# Patient Record
Sex: FEMALE | Race: OTHER | HISPANIC OR LATINO
[De-identification: names, ages, dates, MRNs, and addresses within clinical notes are randomized per-mention and may not be internally consistent; named-entity substitution may affect disease eponyms.]

---

## 2022-06-17 PROBLEM — Z00.00 ENCOUNTER FOR PREVENTIVE HEALTH EXAMINATION: Status: ACTIVE | Noted: 2022-06-17

## 2022-06-22 ENCOUNTER — APPOINTMENT (OUTPATIENT)
Dept: PEDIATRIC SURGERY | Facility: CLINIC | Age: 27
End: 2022-06-22
Payer: COMMERCIAL

## 2022-06-22 DIAGNOSIS — O28.3 ABNORMAL ULTRASONIC FINDING ON ANTENATAL SCREENING OF MOTHER: ICD-10-CM

## 2022-06-22 PROCEDURE — 99204 OFFICE O/P NEW MOD 45 MIN: CPT | Mod: 95

## 2022-06-26 PROBLEM — O28.3 ABNORMAL FETAL ULTRASOUND: Status: ACTIVE | Noted: 2022-06-26

## 2022-06-26 NOTE — ASSESSMENT
[FreeTextEntry1] : Ms Bella is a 27 year old female, now carrying a 34 week gestational age fetus found to have a suprarenal lesion.  This was recently seen on prenatal US as a 2.5 x 1.7 x 2.6 cm cystic structure superio-medial to the left kidney without any flow.  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  \par \par I spent time educating Ms. Bella about this finding and offered reassurance. I reviewed the differential diagnosis and the possibility that this represents an infra-diaphragmatic lesion such as a sequestration vs. a lesion of adrenal (or extra-adrenal) origin such as neuroblastoma vs an adrenal hemorrhage. I reviewed with her that it may be difficult to understand the precise anatomy on prenatal imaging. I reassured her that at this point in time, I do not recommend any changes in their delivery plan and recommend delivery as close to term as possible.\par \par I reviewed with her that the baby will undergo imaging after birth in the hopes of better understanding the etiology of the lesion. I would recommend an US after birth to assess the retroperitoneal regions. I reviewed the treatment plans for each of these possibilities and she understand that the possibility of surgical intervention would depend upon the baby's symptoms, the location of the mass, as well as the size and characteristics of the mass.\par \par She understands should this be infra-diaphragmatic sequestration, an adrenal hemorrhage or a congenital neuroblastoma, that typically a non-operative approach with close surveillance would be recommended. She understands that an adrenal hemorrhage would typically resolve spontaneously and likely does not need any further intervention.  I spent time educating her about congenital neuroblastoma, I reassured her that these often have a better prognosis than those seen later in life and behave in a very different manner than the typical neuroblastoma of older years.  She understands the 80% likelihood that a congenital neuroblastoma will not require any surgical intervention, but that this will depend upon both imaging and laboratory values both after birth and thereafter. She understands the possibility that immediate post- imaging may also be confounding and a definitive diagnosis may require further imaging with close surveillance. \par \par Ms Bella demonstrates understanding that this may be more clear after the US is obtained after birth at which time more definitive plans for surveillance will be made. We will continue to monitor with serial prenatal ultrasounds and she knows she can follow up with me should there be any new findings that would be helpful for her to have further counseling. She has my contact information and know to contact me as well should she have any further questions or concerns. \par

## 2022-06-26 NOTE — HISTORY OF PRESENT ILLNESS
[FreeTextEntry1] : Ms Bella is a 27 year old female here today via its learning, now carrying a 34 week gestational age fetus found to have a suprarenal lesion on prenatal imaging.  She is here today to further discuss these findings and the plan of care for their baby after birth.

## 2022-06-26 NOTE — CONSULT LETTER
[Dear  ___] : Dear  [unfilled], [Consult Letter:] : I had the pleasure of evaluating your patient, [unfilled]. [Consult Closing:] : Thank you very much for allowing me to participate in the care of this patient.  If you have any questions, please do not hesitate to contact me. [Sincerely,] : Sincerely, [FreeTextEntry2] : Dr Laura Blackmon\par  [address] [facility], [address]\[place] [address]\par \par Phone: [phone]     [FreeTextEntry3] : James Scherer MD\par Division of Pediatric Surgery\par [facility]\par \par

## 2022-07-25 ENCOUNTER — TRANSCRIPTION ENCOUNTER (OUTPATIENT)
Age: 27
End: 2022-07-25

## 2022-07-25 ENCOUNTER — INPATIENT (INPATIENT)
Facility: HOSPITAL | Age: 27
LOS: 2 days | Discharge: HOME | End: 2022-07-28
Attending: OBSTETRICS & GYNECOLOGY | Admitting: OBSTETRICS & GYNECOLOGY
Payer: COMMERCIAL

## 2022-07-25 VITALS — TEMPERATURE: 98 F | DIASTOLIC BLOOD PRESSURE: 91 MMHG | SYSTOLIC BLOOD PRESSURE: 134 MMHG

## 2022-07-25 DIAGNOSIS — Z34.80 ENCOUNTER FOR SUPERVISION OF OTHER NORMAL PREGNANCY, UNSPECIFIED TRIMESTER: ICD-10-CM

## 2022-07-25 DIAGNOSIS — O26.899 OTHER SPECIFIED PREGNANCY RELATED CONDITIONS, UNSPECIFIED TRIMESTER: ICD-10-CM

## 2022-07-25 DIAGNOSIS — Z3A.00 WEEKS OF GESTATION OF PREGNANCY NOT SPECIFIED: ICD-10-CM

## 2022-07-25 LAB
BASOPHILS # BLD AUTO: 0.03 K/UL — SIGNIFICANT CHANGE UP (ref 0–0.2)
BASOPHILS NFR BLD AUTO: 0.3 % — SIGNIFICANT CHANGE UP (ref 0–2)
EOSINOPHIL # BLD AUTO: 0.03 K/UL — SIGNIFICANT CHANGE UP (ref 0–0.5)
EOSINOPHIL NFR BLD AUTO: 0.3 % — SIGNIFICANT CHANGE UP (ref 0–6)
HCT VFR BLD CALC: 35.6 % — SIGNIFICANT CHANGE UP (ref 34.5–45)
HGB BLD-MCNC: 11.6 G/DL — SIGNIFICANT CHANGE UP (ref 11.5–15.5)
IMM GRANULOCYTES NFR BLD AUTO: 1 % — SIGNIFICANT CHANGE UP (ref 0–1.5)
LYMPHOCYTES # BLD AUTO: 2.01 K/UL — SIGNIFICANT CHANGE UP (ref 1–3.3)
LYMPHOCYTES # BLD AUTO: 21.8 % — SIGNIFICANT CHANGE UP (ref 13–44)
MCHC RBC-ENTMCNC: 26.5 PG — LOW (ref 27–34)
MCHC RBC-ENTMCNC: 32.6 GM/DL — SIGNIFICANT CHANGE UP (ref 32–36)
MCV RBC AUTO: 81.5 FL — SIGNIFICANT CHANGE UP (ref 80–100)
MONOCYTES # BLD AUTO: 0.46 K/UL — SIGNIFICANT CHANGE UP (ref 0–0.9)
MONOCYTES NFR BLD AUTO: 5 % — SIGNIFICANT CHANGE UP (ref 2–14)
NEUTROPHILS # BLD AUTO: 6.6 K/UL — SIGNIFICANT CHANGE UP (ref 1.8–7.4)
NEUTROPHILS NFR BLD AUTO: 71.6 % — SIGNIFICANT CHANGE UP (ref 43–77)
NRBC # BLD: 0 /100 WBCS — SIGNIFICANT CHANGE UP (ref 0–0)
PLATELET # BLD AUTO: 166 K/UL — SIGNIFICANT CHANGE UP (ref 150–400)
RBC # BLD: 4.37 M/UL — SIGNIFICANT CHANGE UP (ref 3.8–5.2)
RBC # FLD: 13.8 % — SIGNIFICANT CHANGE UP (ref 10.3–14.5)
WBC # BLD: 9.22 K/UL — SIGNIFICANT CHANGE UP (ref 3.8–10.5)
WBC # FLD AUTO: 9.22 K/UL — SIGNIFICANT CHANGE UP (ref 3.8–10.5)

## 2022-07-25 RX ORDER — SODIUM CHLORIDE 9 MG/ML
1000 INJECTION, SOLUTION INTRAVENOUS
Refills: 0 | Status: DISCONTINUED | OUTPATIENT
Start: 2022-07-25 | End: 2022-07-26

## 2022-07-25 RX ORDER — OXYTOCIN 10 UNIT/ML
333.33 VIAL (ML) INJECTION
Qty: 20 | Refills: 0 | Status: DISCONTINUED | OUTPATIENT
Start: 2022-07-25 | End: 2022-07-28

## 2022-07-25 RX ORDER — CITRIC ACID/SODIUM CITRATE 300-500 MG
15 SOLUTION, ORAL ORAL EVERY 6 HOURS
Refills: 0 | Status: DISCONTINUED | OUTPATIENT
Start: 2022-07-25 | End: 2022-07-26

## 2022-07-25 RX ORDER — CHLORHEXIDINE GLUCONATE 213 G/1000ML
1 SOLUTION TOPICAL ONCE
Refills: 0 | Status: DISCONTINUED | OUTPATIENT
Start: 2022-07-25 | End: 2022-07-26

## 2022-07-25 RX ORDER — AMPICILLIN TRIHYDRATE 250 MG
2 CAPSULE ORAL ONCE
Refills: 0 | Status: COMPLETED | OUTPATIENT
Start: 2022-07-25 | End: 2022-07-25

## 2022-07-25 RX ORDER — AMPICILLIN TRIHYDRATE 250 MG
1 CAPSULE ORAL EVERY 4 HOURS
Refills: 0 | Status: DISCONTINUED | OUTPATIENT
Start: 2022-07-25 | End: 2022-07-26

## 2022-07-25 RX ADMIN — Medication 200 GRAM(S): at 23:37

## 2022-07-25 RX ADMIN — SODIUM CHLORIDE 125 MILLILITER(S): 9 INJECTION, SOLUTION INTRAVENOUS at 23:37

## 2022-07-25 NOTE — OB PROVIDER H&P - ATTENDING COMMENTS
chart and events noted  P1 at term in early labor, hx of hidradenitis, +hsv serology w/o prior outbreaks  has several lesions in vulva which appear like boils, on in particular in L vulva weeping at the tip, painless  pt currently on valtrex suppression  low likelihood of hsv given pt on suppression and appearance similar to other boils in the past, however d/w pt and  unable to exclude hsv w certainty.  risks of fetal hsv reviewed.  option of primary c/s vs labor reviewed  after discussion or r/b/options, they opted for primary c/s  team notified, consents obtained, awaiting OR  calluzzo

## 2022-07-25 NOTE — OB RN PATIENT PROFILE - FALL HARM RISK - UNIVERSAL INTERVENTIONS
Bed in lowest position, wheels locked, appropriate side rails in place/Call bell, personal items and telephone in reach/Instruct patient to call for assistance before getting out of bed or chair/Non-slip footwear when patient is out of bed/[place] to call system/Purposeful Proactive Rounding/Room/bathroom lighting operational, light cord in reach

## 2022-07-25 NOTE — OB PROVIDER H&P - NSHPPHYSICALEXAM_GEN_ALL_CORE
Objective  – Vital Signs  BP: 134/91  HR: 62    – PE:   CV: RRR  Pulm: breathing comfortably on RA  Abd: gravid, nontender  Extr: moving all extremities with ease    HSV exam: sterile spec neg for lesion, possible HSV lesion on left labia majora.  – VE: 3/50/-3  – FHT: baseline 135, mod variability, +accels, -decels  – Baxley: 6qmin  – EFW: 3900g by sono  – Sono: vertex

## 2022-07-25 NOTE — OB RN TRIAGE NOTE - NSMATERNALFETALCONCERNS_OBGYN_ALL_OB_FT
Fetal Alert  22 - LGA fetus.  Right hydronephrosis with caliectasis and ureteral dilation.  Cystic structure near left kidney most likely adrenal gland hemorrhage, adrenal cysts, or splenic cyst.  Fetal MRI done, suspect non neoplastic adrenal hemorrhage vs congenital neuroblastoma.  Prenatal peds surgery consult scheduled with Dr. Scherer on 22, see consult note. (Sonos done in OB office by MFM.) Notify peds at delivery. -DORA Simpson  Fetal MDM done on 22 ,  imaging prior to discharge- see note for detailed plan. -Magalys Connell RNC

## 2022-07-25 NOTE — PRE-ANESTHESIA EVALUATION ADULT - NSANTHOSAYNRD_GEN_A_CORE
Lcl Root Units: 0 Show Depressor Anguli Units: Yes Additional Area 3 Units: [address] [address] Show Right And Left Brow Units: No Additional Area 1 Units: [facility] Dilution (U/ 0.1cc): 1.5 Additional Area 2 Location: bunny lines Topical Anesthesia?: 20% benzocaine, 8% lidocaine, 4% tetracaine Additional Area 4 Location: lateral eyes Consent: Written consent obtained. Risks include but not limited to lid/brow ptosis, bruising, swelling, diplopia, temporary effect, incomplete chemical denervation. Additional Area 4 Units: [address] [facility] screening performed.  STOP BANG Legend: 0-2 = LOW Risk; 3-4 = INTERMEDIATE Risk; 5-8 = HIGH Risk Expiration Date (Month Year): 05/31/22 Additional Area 2 Units: 10 Length Of Topical Anesthesia Application (Optional): 15 minutes Lot #: [id number] Additional Area 5 Location: glabella Detail Level: Simple Price (Use Numbers Only, No Special Characters Or $): 0.00 Additional Area 1 Location: high forehead 2 cm above brows

## 2022-07-25 NOTE — PRE-ANESTHESIA EVALUATION ADULT - NSANTHPMHFT_GEN_ALL_CORE
27yoF  gestational age 38+6 presents in labor.     – PNC: HSV found on serology.    Denies history of back pain, neurological deficits, or bleeding disorders.     denies Aspirin, antiplatelet or anticoagulation

## 2022-07-25 NOTE — OB RN PATIENT PROFILE - FUNCTIONAL ASSESSMENT - BASIC MOBILITY 6.
4-calculated by average/Not able to assess (calculate score using [facility] averaging method)

## 2022-07-25 NOTE — OB RN TRIAGE NOTE - FALL HARM RISK - UNIVERSAL INTERVENTIONS
Bed in lowest position, wheels locked, appropriate side rails in place/Call bell, personal items and telephone in reach/Instruct patient to call for assistance before getting out of bed or chair/Non-slip footwear when patient is out of bed/[place] to call system/Purposeful Proactive Rounding/Room/bathroom lighting operational, light cord in reach

## 2022-07-25 NOTE — PRE-ANESTHESIA EVALUATION ADULT - NSANTHASARD_GEN_ALL_CORE
2E I have reviewed and confirmed nurses' notes for patient's medications, allergies, medical history, and surgical history.

## 2022-07-25 NOTE — OB RN TRIAGE NOTE - NS_GESTAGE_OBGYN_ALL_OB_FT
Celeste Amato discharged to home accompanied by other NA.   Patient provided with the following educational materials upon discharge: noncardiac chest pain.   Valuables and belongings sent with patient.   discharge instructions and follow up appointments reviewed with patient and other NA.  Patient and other NA verbalized understanding     Patient picked up by  and ambulated off the unit.   PIV removed. Telemetry removed.   38w6d

## 2022-07-25 NOTE — OB PROVIDER H&P - ASSESSMENT
Assessment  – 27yoF  gestational age 38+6 presents in labor. No prenatal issues. GBS +.    Plan  1. Admit to LND. Routine Labs. IVF.  2. Expectant management  3. Fetus: cat 1 tracing. VTX. EFW 3900g by sono. Continuous EFM. Sono. No concerns.  4. Prenatal issues: HSV on serology possible lesion on left labia  5. GBS +  6. Pain: IV pain meds/epidural PRN    Patient discussed with attending physician, Dr. Cotto .    Tien Cuba MD PGY1

## 2022-07-25 NOTE — OB PROVIDER H&P - HISTORY OF PRESENT ILLNESS
Admission H&P    Subjective  HPI: 27yoF  gestational age 38+6 presents in labor. +FM. -LOF. -CTXs. -VB. Pt denies any other concerns.    – PNC: HSV found on serology. GBS +.  EFW g by noe.  – OBHx:    TOP s/p D&C  2016 FT  6#9  – GynHx: denies  – PMH: hydradenitis suppurativa   – PSH: denies  – Psych: denies   – Social: denies   – Meds: PNV, valtrex  – Allergies: NKDA  – Will accept blood transfusions? Yes

## 2022-07-26 LAB
ALBUMIN SERPL ELPH-MCNC: 3.4 G/DL — SIGNIFICANT CHANGE UP (ref 3.3–5)
ALP SERPL-CCNC: 147 U/L — HIGH (ref 40–120)
ALT FLD-CCNC: 6 U/L — LOW (ref 10–45)
ANION GAP SERPL CALC-SCNC: 13 MMOL/L — SIGNIFICANT CHANGE UP (ref 5–17)
APPEARANCE UR: CLEAR — SIGNIFICANT CHANGE UP
APTT BLD: 25.2 SEC — LOW (ref 27.5–35.5)
AST SERPL-CCNC: 13 U/L — SIGNIFICANT CHANGE UP (ref 10–40)
BACTERIA # UR AUTO: ABNORMAL
BILIRUB SERPL-MCNC: 0.1 MG/DL — LOW (ref 0.2–1.2)
BILIRUB UR-MCNC: NEGATIVE — SIGNIFICANT CHANGE UP
BLD GP AB SCN SERPL QL: NEGATIVE — SIGNIFICANT CHANGE UP
BUN SERPL-MCNC: 13 MG/DL — SIGNIFICANT CHANGE UP (ref 7–23)
CALCIUM SERPL-MCNC: 9.1 MG/DL — SIGNIFICANT CHANGE UP (ref 8.4–10.5)
CHLORIDE SERPL-SCNC: 107 MMOL/L — SIGNIFICANT CHANGE UP (ref 96–108)
CO2 SERPL-SCNC: 18 MMOL/L — LOW (ref 22–31)
COLOR SPEC: YELLOW — SIGNIFICANT CHANGE UP
COVID-19 SPIKE DOMAIN AB INTERP: POSITIVE
COVID-19 SPIKE DOMAIN ANTIBODY RESULT: >250 U/ML — HIGH
CREAT ?TM UR-MCNC: 223 MG/DL — SIGNIFICANT CHANGE UP
CREAT SERPL-MCNC: 0.79 MG/DL — SIGNIFICANT CHANGE UP (ref 0.5–1.3)
DIFF PNL FLD: NEGATIVE — SIGNIFICANT CHANGE UP
EGFR: 106 ML/MIN/1.73M2 — SIGNIFICANT CHANGE UP
EPI CELLS # UR: 10 /HPF — HIGH
FIBRINOGEN PPP-MCNC: 625 MG/DL — HIGH (ref 330–520)
GLUCOSE SERPL-MCNC: 77 MG/DL — SIGNIFICANT CHANGE UP (ref 70–99)
GLUCOSE UR QL: NEGATIVE — SIGNIFICANT CHANGE UP
HSV+VZV DNA SPEC QL NAA+PROBE: SIGNIFICANT CHANGE UP
HYALINE CASTS # UR AUTO: 3 /LPF — HIGH (ref 0–2)
INR BLD: 0.91 RATIO — SIGNIFICANT CHANGE UP (ref 0.88–1.16)
KETONES UR-MCNC: NEGATIVE — SIGNIFICANT CHANGE UP
LDH SERPL L TO P-CCNC: 168 U/L — SIGNIFICANT CHANGE UP (ref 50–242)
LEUKOCYTE ESTERASE UR-ACNC: ABNORMAL
NITRITE UR-MCNC: NEGATIVE — SIGNIFICANT CHANGE UP
PH UR: 7 — SIGNIFICANT CHANGE UP (ref 5–8)
POTASSIUM SERPL-MCNC: 4.2 MMOL/L — SIGNIFICANT CHANGE UP (ref 3.5–5.3)
POTASSIUM SERPL-SCNC: 4.2 MMOL/L — SIGNIFICANT CHANGE UP (ref 3.5–5.3)
PROT ?TM UR-MCNC: 91 MG/DL — HIGH (ref 0–12)
PROT ?TM UR-MCNC: 93 MG/DL — HIGH (ref 0–12)
PROT SERPL-MCNC: 6.8 G/DL — SIGNIFICANT CHANGE UP (ref 6–8.3)
PROT UR-MCNC: 100 — SIGNIFICANT CHANGE UP
PROT/CREAT UR-RTO: 0.4 RATIO — HIGH (ref 0–0.2)
PROTHROM AB SERPL-ACNC: 10.5 SEC — SIGNIFICANT CHANGE UP (ref 10.5–13.4)
RBC CASTS # UR COMP ASSIST: 1 /HPF — SIGNIFICANT CHANGE UP (ref 0–4)
RH IG SCN BLD-IMP: POSITIVE — SIGNIFICANT CHANGE UP
RH IG SCN BLD-IMP: POSITIVE — SIGNIFICANT CHANGE UP
SARS-COV-2 IGG+IGM SERPL QL IA: >250 U/ML — HIGH
SARS-COV-2 IGG+IGM SERPL QL IA: POSITIVE
SARS-COV-2 RNA SPEC QL NAA+PROBE: SIGNIFICANT CHANGE UP
SODIUM SERPL-SCNC: 138 MMOL/L — SIGNIFICANT CHANGE UP (ref 135–145)
SP GR SPEC: 1.03 — HIGH (ref 1.01–1.02)
SPECIMEN SOURCE: SIGNIFICANT CHANGE UP
T PALLIDUM AB TITR SER: NEGATIVE — SIGNIFICANT CHANGE UP
URATE SERPL-MCNC: 6.7 MG/DL — SIGNIFICANT CHANGE UP (ref 2.5–7)
UROBILINOGEN FLD QL: NEGATIVE — SIGNIFICANT CHANGE UP
WBC UR QL: 9 /HPF — HIGH (ref 0–5)

## 2022-07-26 PROCEDURE — 59514 CESAREAN DELIVERY ONLY: CPT | Mod: AS,U9

## 2022-07-26 RX ORDER — SIMETHICONE 80 MG/1
80 TABLET, CHEWABLE ORAL EVERY 4 HOURS
Refills: 0 | Status: DISCONTINUED | OUTPATIENT
Start: 2022-07-26 | End: 2022-07-28

## 2022-07-26 RX ORDER — DIPHENHYDRAMINE HCL 50 MG
25 CAPSULE ORAL EVERY 6 HOURS
Refills: 0 | Status: DISCONTINUED | OUTPATIENT
Start: 2022-07-26 | End: 2022-07-28

## 2022-07-26 RX ORDER — SODIUM CHLORIDE 9 MG/ML
1000 INJECTION, SOLUTION INTRAVENOUS ONCE
Refills: 0 | Status: DISCONTINUED | OUTPATIENT
Start: 2022-07-26 | End: 2022-07-28

## 2022-07-26 RX ORDER — HEPARIN SODIUM 5000 [USP'U]/ML
5000 INJECTION INTRAVENOUS; SUBCUTANEOUS EVERY 12 HOURS
Refills: 0 | Status: DISCONTINUED | OUTPATIENT
Start: 2022-07-26 | End: 2022-07-28

## 2022-07-26 RX ORDER — KETOROLAC TROMETHAMINE 30 MG/ML
30 SYRINGE (ML) INJECTION EVERY 6 HOURS
Refills: 0 | Status: DISCONTINUED | OUTPATIENT
Start: 2022-07-26 | End: 2022-07-28

## 2022-07-26 RX ORDER — LANOLIN
1 OINTMENT (GRAM) TOPICAL EVERY 6 HOURS
Refills: 0 | Status: DISCONTINUED | OUTPATIENT
Start: 2022-07-26 | End: 2022-07-28

## 2022-07-26 RX ORDER — DEXAMETHASONE 0.5 MG/5ML
4 ELIXIR ORAL EVERY 6 HOURS
Refills: 0 | Status: DISCONTINUED | OUTPATIENT
Start: 2022-07-26 | End: 2022-07-27

## 2022-07-26 RX ORDER — MAGNESIUM HYDROXIDE 400 MG/1
30 TABLET, CHEWABLE ORAL
Refills: 0 | Status: DISCONTINUED | OUTPATIENT
Start: 2022-07-26 | End: 2022-07-28

## 2022-07-26 RX ORDER — VALACYCLOVIR 500 MG/1
1 TABLET, FILM COATED ORAL
Qty: 5 | Refills: 0
Start: 2022-07-26 | End: 2022-07-30

## 2022-07-26 RX ORDER — OXYCODONE HYDROCHLORIDE 5 MG/1
5 TABLET ORAL ONCE
Refills: 0 | Status: DISCONTINUED | OUTPATIENT
Start: 2022-07-26 | End: 2022-07-28

## 2022-07-26 RX ORDER — NALOXONE HYDROCHLORIDE 4 MG/.1ML
0.1 SPRAY NASAL
Refills: 0 | Status: DISCONTINUED | OUTPATIENT
Start: 2022-07-26 | End: 2022-07-27

## 2022-07-26 RX ORDER — OXYCODONE HYDROCHLORIDE 5 MG/1
5 TABLET ORAL
Refills: 0 | Status: COMPLETED | OUTPATIENT
Start: 2022-07-26 | End: 2022-08-02

## 2022-07-26 RX ORDER — OXYTOCIN 10 UNIT/ML
333.33 VIAL (ML) INJECTION
Qty: 20 | Refills: 0 | Status: DISCONTINUED | OUTPATIENT
Start: 2022-07-26 | End: 2022-07-28

## 2022-07-26 RX ORDER — IBUPROFEN 200 MG
600 TABLET ORAL EVERY 6 HOURS
Refills: 0 | Status: COMPLETED | OUTPATIENT
Start: 2022-07-26 | End: 2023-06-24

## 2022-07-26 RX ORDER — VALACYCLOVIR 500 MG/1
1000 TABLET, FILM COATED ORAL DAILY
Refills: 0 | Status: COMPLETED | OUTPATIENT
Start: 2022-07-26 | End: 2022-07-28

## 2022-07-26 RX ORDER — NALBUPHINE HYDROCHLORIDE 10 MG/ML
2.5 INJECTION, SOLUTION INTRAMUSCULAR; INTRAVENOUS; SUBCUTANEOUS EVERY 6 HOURS
Refills: 0 | Status: DISCONTINUED | OUTPATIENT
Start: 2022-07-26 | End: 2022-07-27

## 2022-07-26 RX ORDER — TETANUS TOXOID, REDUCED DIPHTHERIA TOXOID AND ACELLULAR PERTUSSIS VACCINE, ADSORBED 5; 2.5; 8; 8; 2.5 [IU]/.5ML; [IU]/.5ML; UG/.5ML; UG/.5ML; UG/.5ML
0.5 SUSPENSION INTRAMUSCULAR ONCE
Refills: 0 | Status: DISCONTINUED | OUTPATIENT
Start: 2022-07-26 | End: 2022-07-28

## 2022-07-26 RX ORDER — SODIUM CHLORIDE 9 MG/ML
1000 INJECTION, SOLUTION INTRAVENOUS
Refills: 0 | Status: DISCONTINUED | OUTPATIENT
Start: 2022-07-26 | End: 2022-07-28

## 2022-07-26 RX ORDER — ONDANSETRON 8 MG/1
4 TABLET, FILM COATED ORAL EVERY 6 HOURS
Refills: 0 | Status: DISCONTINUED | OUTPATIENT
Start: 2022-07-26 | End: 2022-07-27

## 2022-07-26 RX ORDER — MORPHINE SULFATE 50 MG/1
0.1 CAPSULE, EXTENDED RELEASE ORAL ONCE
Refills: 0 | Status: DISCONTINUED | OUTPATIENT
Start: 2022-07-26 | End: 2022-07-27

## 2022-07-26 RX ORDER — IBUPROFEN 200 MG
600 TABLET ORAL EVERY 6 HOURS
Refills: 0 | Status: DISCONTINUED | OUTPATIENT
Start: 2022-07-26 | End: 2022-07-28

## 2022-07-26 RX ORDER — OXYCODONE HYDROCHLORIDE 5 MG/1
5 TABLET ORAL
Refills: 0 | Status: DISCONTINUED | OUTPATIENT
Start: 2022-07-26 | End: 2022-07-28

## 2022-07-26 RX ORDER — ACETAMINOPHEN 500 MG
975 TABLET ORAL
Refills: 0 | Status: DISCONTINUED | OUTPATIENT
Start: 2022-07-26 | End: 2022-07-28

## 2022-07-26 RX ADMIN — Medication 30 MILLIGRAM(S): at 18:37

## 2022-07-26 RX ADMIN — Medication 975 MILLIGRAM(S): at 20:53

## 2022-07-26 RX ADMIN — Medication 975 MILLIGRAM(S): at 21:23

## 2022-07-26 RX ADMIN — Medication 30 MILLIGRAM(S): at 18:06

## 2022-07-26 RX ADMIN — Medication 975 MILLIGRAM(S): at 15:08

## 2022-07-26 RX ADMIN — Medication 30 MILLIGRAM(S): at 13:30

## 2022-07-26 RX ADMIN — VALACYCLOVIR 1000 MILLIGRAM(S): 500 TABLET, FILM COATED ORAL at 13:16

## 2022-07-26 RX ADMIN — Medication 30 MILLIGRAM(S): at 13:14

## 2022-07-26 RX ADMIN — HEPARIN SODIUM 5000 UNIT(S): 5000 INJECTION INTRAVENOUS; SUBCUTANEOUS at 18:05

## 2022-07-26 RX ADMIN — Medication 975 MILLIGRAM(S): at 14:12

## 2022-07-26 NOTE — OB RN DELIVERY SUMMARY - NSSELHIDDEN_OBGYN_ALL_OB_FT
[NS_DeliveryAttending1_OBGYN_ALL_OB_FT:[id number]==],[NS_DeliveryAssist1_OBGYN_ALL_OB_FT:[id number]=],[NS_DeliveryRN_OBGYN_ALL_OB_FT:[id number]=]

## 2022-07-26 NOTE — CHART NOTE - NSCHARTNOTEFT_GEN_A_CORE
UOP 100cc in the last hour. Patient is cleared to go to the postpartum floor.    Vital Signs Last 24 Hrs  T(C): 36.8 (26 Jul 2022 02:15), Max: 36.8 (25 Jul 2022 21:40)  T(F): 98.2 (26 Jul 2022 02:15), Max: 98.24 (25 Jul 2022 21:40)  HR: 64 (26 Jul 2022 07:00) (49 - 90)  BP: 128/75 (26 Jul 2022 06:45) (101/56 - 153/88)  BP(mean): 96 (26 Jul 2022 06:45) (73 - 103)  RR: 18 (26 Jul 2022 06:45) (16 - 18)  SpO2: 97% (26 Jul 2022 07:00) (84% - 98%)    Parameters below as of 26 Jul 2022 06:45  Patient On (Oxygen Delivery Method): room air        Genet Vargas PGY1

## 2022-07-26 NOTE — OB RN INTRAOPERATIVE NOTE - NSSELHIDDEN_OBGYN_ALL_OB_FT
[NS_DeliveryAttending1_OBGYN_ALL_OB_FT:[id number]==],[NS_DeliveryAssist1_OBGYN_ALL_OB_FT:[id number]=],[NS_DeliveryRN_OBGYN_ALL_OB_FT:[id number]=]

## 2022-07-26 NOTE — OB PROVIDER DELIVERY SUMMARY - NSPROVIDERDELIVERYNOTE_OBGYN_ALL_OB_FT
pLTCS of liveborn male infant 2/2 active lesion noted on labia with hx of HSV. Grossly normal uterus, tubes, ovaries. Double layer uterine closure performed. Excellent hemostasis noted.     APGARS 9/9  IVF 2L    Urine output 150    Dictation # [id number] By Jacki Bautista PA-C

## 2022-07-26 NOTE — OB PROVIDER DELIVERY SUMMARY - NSSELHIDDEN_OBGYN_ALL_OB_FT
[NS_DeliveryAttending1_OBGYN_ALL_OB_FT:[id number]==],[NS_DeliveryAssist1_OBGYN_ALL_OB_FT:[id number]=],[NS_DeliveryRN_OBGYN_ALL_OB_FT:[id number]=]

## 2022-07-26 NOTE — OB RN DELIVERY SUMMARY - NS_SEPSISRSKCALC_OBGYN_ALL_OB_FT
EOS calculated successfully. EOS Risk Factor: 0.5/1000 live births ([facility] national incidence); GA=39w;Temp=98.24; ROM=0.033; GBS='Positive'; Antibiotics='No antibiotics or any antibiotics < 2 hrs prior to birth'

## 2022-07-27 LAB
BASOPHILS # BLD AUTO: 0.02 K/UL — SIGNIFICANT CHANGE UP (ref 0–0.2)
BASOPHILS NFR BLD AUTO: 0.3 % — SIGNIFICANT CHANGE UP (ref 0–2)
EOSINOPHIL # BLD AUTO: 0.08 K/UL — SIGNIFICANT CHANGE UP (ref 0–0.5)
EOSINOPHIL NFR BLD AUTO: 1.3 % — SIGNIFICANT CHANGE UP (ref 0–6)
HCT VFR BLD CALC: 28.4 % — LOW (ref 34.5–45)
HGB BLD-MCNC: 9 G/DL — LOW (ref 11.5–15.5)
IMM GRANULOCYTES NFR BLD AUTO: 0.7 % — SIGNIFICANT CHANGE UP (ref 0–1.5)
LYMPHOCYTES # BLD AUTO: 1.42 K/UL — SIGNIFICANT CHANGE UP (ref 1–3.3)
LYMPHOCYTES # BLD AUTO: 23.2 % — SIGNIFICANT CHANGE UP (ref 13–44)
MCHC RBC-ENTMCNC: 26.7 PG — LOW (ref 27–34)
MCHC RBC-ENTMCNC: 31.7 GM/DL — LOW (ref 32–36)
MCV RBC AUTO: 84.3 FL — SIGNIFICANT CHANGE UP (ref 80–100)
MONOCYTES # BLD AUTO: 0.31 K/UL — SIGNIFICANT CHANGE UP (ref 0–0.9)
MONOCYTES NFR BLD AUTO: 5.1 % — SIGNIFICANT CHANGE UP (ref 2–14)
NEUTROPHILS # BLD AUTO: 4.26 K/UL — SIGNIFICANT CHANGE UP (ref 1.8–7.4)
NEUTROPHILS NFR BLD AUTO: 69.4 % — SIGNIFICANT CHANGE UP (ref 43–77)
NRBC # BLD: 0 /100 WBCS — SIGNIFICANT CHANGE UP (ref 0–0)
PLATELET # BLD AUTO: 123 K/UL — LOW (ref 150–400)
RBC # BLD: 3.37 M/UL — LOW (ref 3.8–5.2)
RBC # FLD: 14.3 % — SIGNIFICANT CHANGE UP (ref 10.3–14.5)
WBC # BLD: 6.13 K/UL — SIGNIFICANT CHANGE UP (ref 3.8–10.5)
WBC # FLD AUTO: 6.13 K/UL — SIGNIFICANT CHANGE UP (ref 3.8–10.5)

## 2022-07-27 RX ORDER — FERROUS SULFATE 325(65) MG
325 TABLET ORAL THREE TIMES A DAY
Refills: 0 | Status: DISCONTINUED | OUTPATIENT
Start: 2022-07-27 | End: 2022-07-28

## 2022-07-27 RX ORDER — ASCORBIC ACID 60 MG
500 TABLET,CHEWABLE ORAL THREE TIMES A DAY
Refills: 0 | Status: DISCONTINUED | OUTPATIENT
Start: 2022-07-27 | End: 2022-07-28

## 2022-07-27 RX ADMIN — Medication 975 MILLIGRAM(S): at 21:48

## 2022-07-27 RX ADMIN — Medication 30 MILLIGRAM(S): at 00:19

## 2022-07-27 RX ADMIN — HEPARIN SODIUM 5000 UNIT(S): 5000 INJECTION INTRAVENOUS; SUBCUTANEOUS at 18:24

## 2022-07-27 RX ADMIN — Medication 975 MILLIGRAM(S): at 16:30

## 2022-07-27 RX ADMIN — Medication 975 MILLIGRAM(S): at 09:50

## 2022-07-27 RX ADMIN — Medication 975 MILLIGRAM(S): at 03:23

## 2022-07-27 RX ADMIN — Medication 975 MILLIGRAM(S): at 16:01

## 2022-07-27 RX ADMIN — Medication 600 MILLIGRAM(S): at 13:36

## 2022-07-27 RX ADMIN — VALACYCLOVIR 1000 MILLIGRAM(S): 500 TABLET, FILM COATED ORAL at 13:59

## 2022-07-27 RX ADMIN — Medication 600 MILLIGRAM(S): at 18:24

## 2022-07-27 RX ADMIN — Medication 975 MILLIGRAM(S): at 09:12

## 2022-07-27 RX ADMIN — Medication 325 MILLIGRAM(S): at 21:48

## 2022-07-27 RX ADMIN — Medication 30 MILLIGRAM(S): at 00:49

## 2022-07-27 RX ADMIN — Medication 975 MILLIGRAM(S): at 22:15

## 2022-07-27 RX ADMIN — Medication 975 MILLIGRAM(S): at 03:53

## 2022-07-27 RX ADMIN — Medication 30 MILLIGRAM(S): at 06:00

## 2022-07-27 RX ADMIN — Medication 500 MILLIGRAM(S): at 21:48

## 2022-07-27 RX ADMIN — Medication 600 MILLIGRAM(S): at 14:05

## 2022-07-27 RX ADMIN — HEPARIN SODIUM 5000 UNIT(S): 5000 INJECTION INTRAVENOUS; SUBCUTANEOUS at 06:00

## 2022-07-27 NOTE — CHART NOTE - NSCHARTNOTEFT_GEN_A_CORE
PA NOTE      POD#1         Vital Signs Last 24 Hrs  T(C): 37 (2022 12:16), Max: 37 (2022 12:16)  T(F): 98.6 (2022 12:16), Max: 98.6 (2022 12:16)  HR: 84 (2022 12:16) (63 - 84)  BP: 123/81 (2022 12:16) (119/78 - 128/82)  BP(mean): --  RR: 18 (2022 12:16) (18 - 18)  SpO2: 97% (2022 12:16) (97% - 99%)    Parameters below as of 2022 09:07  Patient On (Oxygen Delivery Method): room air                   9.0    6.13  )-----------( 123      (  @ 07:10 )             28.4                11.6   9.22  )-----------( 166      (  @ 23:47 )             35.6           Assessment: Anemia secondary to acute blood loss due to delivery    Plan:  - Ferrous Sulfate, Vitamin C supplementation.  - Monitor for signs/symptoms of anemia.   - Does not require transfusion at this time

## 2022-07-28 ENCOUNTER — TRANSCRIPTION ENCOUNTER (OUTPATIENT)
Age: 27
End: 2022-07-28

## 2022-07-28 VITALS
RESPIRATION RATE: 18 BRPM | HEART RATE: 61 BPM | SYSTOLIC BLOOD PRESSURE: 121 MMHG | TEMPERATURE: 98 F | OXYGEN SATURATION: 98 % | DIASTOLIC BLOOD PRESSURE: 73 MMHG

## 2022-07-28 PROCEDURE — 86850 RBC ANTIBODY SCREEN: CPT

## 2022-07-28 PROCEDURE — G0463: CPT

## 2022-07-28 PROCEDURE — 84550 ASSAY OF BLOOD/URIC ACID: CPT

## 2022-07-28 PROCEDURE — 85025 COMPLETE CBC W/AUTO DIFF WBC: CPT

## 2022-07-28 PROCEDURE — U0003: CPT

## 2022-07-28 PROCEDURE — 36415 COLL VENOUS BLD VENIPUNCTURE: CPT

## 2022-07-28 PROCEDURE — 80053 COMPREHEN METABOLIC PANEL: CPT

## 2022-07-28 PROCEDURE — 86769 SARS-COV-2 COVID-19 ANTIBODY: CPT

## 2022-07-28 PROCEDURE — 86780 TREPONEMA PALLIDUM: CPT

## 2022-07-28 PROCEDURE — 81001 URINALYSIS AUTO W/SCOPE: CPT

## 2022-07-28 PROCEDURE — 82570 ASSAY OF URINE CREATININE: CPT

## 2022-07-28 PROCEDURE — 59050 FETAL MONITOR W/REPORT: CPT

## 2022-07-28 PROCEDURE — 84156 ASSAY OF PROTEIN URINE: CPT

## 2022-07-28 PROCEDURE — 86900 BLOOD TYPING SEROLOGIC ABO: CPT

## 2022-07-28 PROCEDURE — 59025 FETAL NON-STRESS TEST: CPT

## 2022-07-28 PROCEDURE — 87798 DETECT AGENT NOS DNA AMP: CPT

## 2022-07-28 PROCEDURE — 86901 BLOOD TYPING SEROLOGIC RH(D): CPT

## 2022-07-28 PROCEDURE — U0005: CPT

## 2022-07-28 PROCEDURE — 83615 LACTATE (LD) (LDH) ENZYME: CPT

## 2022-07-28 PROCEDURE — 85610 PROTHROMBIN TIME: CPT

## 2022-07-28 PROCEDURE — 85730 THROMBOPLASTIN TIME PARTIAL: CPT

## 2022-07-28 PROCEDURE — 85384 FIBRINOGEN ACTIVITY: CPT

## 2022-07-28 PROCEDURE — 87529 HSV DNA AMP PROBE: CPT

## 2022-07-28 RX ORDER — IBUPROFEN 200 MG
1 TABLET ORAL
Qty: 0 | Refills: 0 | DISCHARGE
Start: 2022-07-28

## 2022-07-28 RX ADMIN — Medication 975 MILLIGRAM(S): at 08:39

## 2022-07-28 RX ADMIN — VALACYCLOVIR 1000 MILLIGRAM(S): 500 TABLET, FILM COATED ORAL at 13:53

## 2022-07-28 RX ADMIN — Medication 975 MILLIGRAM(S): at 03:49

## 2022-07-28 RX ADMIN — Medication 600 MILLIGRAM(S): at 07:15

## 2022-07-28 RX ADMIN — Medication 600 MILLIGRAM(S): at 01:17

## 2022-07-28 RX ADMIN — MAGNESIUM HYDROXIDE 30 MILLILITER(S): 400 TABLET, CHEWABLE ORAL at 01:21

## 2022-07-28 RX ADMIN — Medication 600 MILLIGRAM(S): at 01:50

## 2022-07-28 RX ADMIN — Medication 325 MILLIGRAM(S): at 06:48

## 2022-07-28 RX ADMIN — Medication 975 MILLIGRAM(S): at 04:20

## 2022-07-28 RX ADMIN — Medication 500 MILLIGRAM(S): at 06:48

## 2022-07-28 RX ADMIN — Medication 975 MILLIGRAM(S): at 09:10

## 2022-07-28 RX ADMIN — Medication 600 MILLIGRAM(S): at 14:19

## 2022-07-28 RX ADMIN — HEPARIN SODIUM 5000 UNIT(S): 5000 INJECTION INTRAVENOUS; SUBCUTANEOUS at 06:48

## 2022-07-28 RX ADMIN — Medication 600 MILLIGRAM(S): at 06:48

## 2022-07-28 RX ADMIN — Medication 600 MILLIGRAM(S): at 13:53

## 2022-07-28 NOTE — DISCHARGE NOTE OB - CARE PLAN
1 Principal Discharge DX:	Term pregnancy delivered  Assessment and plan of treatment:	full instructions given  rto 2 weeks or as necessary

## 2022-07-28 NOTE — DISCHARGE NOTE OB - CARE PROVIDER_API CALL
Nabeel Lerma (MD)  Obstetrics and Gynecology  [address], [address]  [place] [id number]  Phone: [phone]  Fax: [phone]  Follow Up Time:

## 2022-07-28 NOTE — PROGRESS NOTE ADULT - SUBJECTIVE AND OBJECTIVE BOX
OB Progress Note:  Delivery, POD#1    S: 26yo POD#1 s/p LTCS . Her pain is well controlled. She is tolerating a regular diet and passing flatus. Denies N/V. Denies CP/SOB/lightheadedness/dizziness.   She is ambulating without difficulty.   Voiding spontaneously.     O:   Vital Signs Last 24 Hrs  T(C): 36.4 (2022 05:49), Max: 36.6 (2022 09:03)  T(F): 97.5 (2022 05:49), Max: 97.9 (2022 09:03)  HR: 68 (2022 05:49) (60 - 87)  BP: 119/78 (2022 05:49) (111/61 - 129/82)  BP(mean): 96 (2022 06:45) (96 - 96)  RR: 18 (2022 05:49) (18 - 18)  SpO2: 98% (2022 05:49) (97% - 99%)    Parameters below as of 2022 05:49  Patient On (Oxygen Delivery Method): room air        Labs:  Blood type: A Positive  Rubella IgG: RPR: Negative                          11.6   9.22 >-----------< 166    (  @ 23:47 )             35.6    22 @ 00:12      138  |  107  |  13  ----------------------------<  77  4.2   |  18<L>  |  0.79        Ca    9.1      2022 00:12    TPro  6.8  /  Alb  3.4  /  TBili  0.1<L>  /  DBili  x   /  AST  13  /  ALT  6<L>  /  AlkPhos  147<H>  22 @ 00:12          PE:  General: NAD  Abdomen: Mildly distended, appropriately tender, incision c/d/i.  Extremities: No erythema, no pitting edema    
Postpartum Note,  Section   ATTENDING NOTE - Post-operative day 1    Subjective:  The patient feels well. Ambulating without difficulty  Pt is tolerating regular diet. Pain well controlled.  She denies nausea and vomiting.     cici javed'nakul pt already voided    She reports normal postpartum bleeding    Physical exam:    Vital Signs Last 24 Hrs  T(C): 36.6 (2022 09:07), Max: 36.6 (2022 21:00)  T(F): 97.9 (2022 09:07), Max: 97.9 (2022 21:00)  HR: 68 (2022 09:07) (60 - 87)  BP: 119/79 (2022 09:07) (111/61 - 128/88)  BP(mean): --  RR: 18 (2022 09:07) (18 - 18)  SpO2: 99% (2022 09:07) (98% - 99%)    Parameters below as of 2022 09:07  Patient On (Oxygen Delivery Method): room air        Gen: NAD  Breast: Soft, nontender, not engorged.  Abdomen: Soft, nontender, no distension , firm uterine fundus at umbilicus.  Incision: Clean, dry, and intact  Pelvic: Normal lochia noted  Ext: No calf tenderness, no hyper reflexia       LABS:                            9.0    6.13  )-----------( 123      ( 2022 07:10 )             28.4                         11.6   9.22  )-----------( 166      ( 2022 23:47 )             35.6       22 @ 00:12      138  |  107  |  13  ----------------------------<  77  4.2   |  18<L>  |  0.79        Ca    9.1      2022 00:12    TPro  6.8  /  Alb  3.4  /  TBili  0.1<L>  /  DBili  x   /  AST  13  /  ALT  6<L>  /  AlkPhos  147<H>  22 @ 00:12        Allergies    No Known Allergies    Intolerances      MEDICATIONS  (STANDING):  acetaminophen     Tablet .. 975 milliGRAM(s) Oral <User Schedule>  diphtheria/tetanus/pertussis (acellular) Vaccine (ADAcel) 0.5 milliLiter(s) IntraMuscular once  heparin   Injectable 5000 Unit(s) SubCutaneous every 12 hours  ibuprofen  Tablet. 600 milliGRAM(s) Oral every 6 hours  ketorolac   Injectable 30 milliGRAM(s) IV Push every 6 hours  lactated ringers Bolus 1000 milliLiter(s) IV Bolus once  lactated ringers. 1000 milliLiter(s) (125 mL/Hr) IV Continuous <Continuous>  oxytocin Infusion 333.333 milliUNIT(s)/Min (1000 mL/Hr) IV Continuous <Continuous>  oxytocin Infusion 333.333 milliUNIT(s)/Min (1000 mL/Hr) IV Continuous <Continuous>  valACYclovir 1000 milliGRAM(s) Oral daily    MEDICATIONS  (PRN):  diphenhydrAMINE 25 milliGRAM(s) Oral every 6 hours PRN Pruritus  lanolin Ointment 1 Application(s) Topical every 6 hours PRN Sore Nipples  magnesium hydroxide Suspension 30 milliLiter(s) Oral two times a day PRN Constipation  oxyCODONE    IR 5 milliGRAM(s) Oral once PRN Moderate to Severe Pain (4-10)  oxyCODONE    IR 5 milliGRAM(s) Oral every 3 hours PRN Moderate to Severe Pain (4-10)  simethicone 80 milliGRAM(s) Chew every 4 hours PRN Gas        Assessment and Plan  POD # 1  s/p  section. Stable.  Encourage ambulation  Continue with PCEA/PCA  Regular diet as tolerated  Follow up CBC            
Postpartum Note,  Section   ATTENDING NOTE - Post-operative day 0    Subjective:  The patient feels well. Ambulating without difficulty  Pt is tolerating regular diet. Pain well controlled.  She denies nausea and vomiting.        (   ) Bolanos still in place       She reports normal postpartum bleeding    Physical exam:    Vital Signs Last 24 Hrs  T(C): 36.8 (2022 02:15), Max: 36.8 (2022 21:40)  T(F): 98.2 (2022 02:15), Max: 98.24 (2022 21:40)  HR: 64 (2022 07:00) (49 - 90)  BP: 128/75 (2022 06:45) (101/56 - 153/88)  BP(mean): 96 (2022 06:45) (73 - 103)  RR: 18 (2022 06:45) (16 - 18)  SpO2: 97% (2022 07:00) (84% - 98%)    Parameters below as of 2022 06:45  Patient On (Oxygen Delivery Method): room air        Gen: NAD  Breast: Soft, nontender, not engorged.  Abdomen: Soft, nontender, no distension , firm uterine fundus at umbilicus.  Incision: Clean, dry, and intact  Pelvic: Normal lochia noted  Ext: No calf tenderness, no hyper reflexia       LABS:                            11.6   9.22  )-----------( 166      ( 2022 23:47 )             35.6     ABO Interpretation: A ( @ 23:45)  Rh Interpretation: Positive ( @ 23:45)  Antibody Screen: Negative ( @ 23:45)  ABO Interpretation: A ( @ 23:45)  Rh Interpretation: Positive ( @ 23:45)    22 @ 00:12      138  |  107  |  13  ----------------------------<  77  4.2   |  18<L>  |  0.79        Ca    9.1      2022 00:12    TPro  6.8  /  Alb  3.4  /  TBili  0.1<L>  /  DBili  x   /  AST  13  /  ALT  6<L>  /  AlkPhos  147<H>  22 @ 00:12        Allergies    No Known Allergies    Intolerances      MEDICATIONS  (STANDING):  acetaminophen     Tablet .. 975 milliGRAM(s) Oral <User Schedule>  diphtheria/tetanus/pertussis (acellular) Vaccine (ADAcel) 0.5 milliLiter(s) IntraMuscular once  heparin   Injectable 5000 Unit(s) SubCutaneous every 12 hours  ibuprofen  Tablet. 600 milliGRAM(s) Oral every 6 hours  ketorolac   Injectable 30 milliGRAM(s) IV Push every 6 hours  lactated ringers Bolus 1000 milliLiter(s) IV Bolus once  lactated ringers. 1000 milliLiter(s) (125 mL/Hr) IV Continuous <Continuous>  morphine PF Spinal 0.1 milliGRAM(s) IntraThecal. once  oxytocin Infusion 333.333 milliUNIT(s)/Min (1000 mL/Hr) IV Continuous <Continuous>  oxytocin Infusion 333.333 milliUNIT(s)/Min (1000 mL/Hr) IV Continuous <Continuous>  valACYclovir 1000 milliGRAM(s) Oral daily    MEDICATIONS  (PRN):  dexAMETHasone  Injectable 4 milliGRAM(s) IV Push every 6 hours PRN Nausea  diphenhydrAMINE 25 milliGRAM(s) Oral every 6 hours PRN Pruritus  lanolin Ointment 1 Application(s) Topical every 6 hours PRN Sore Nipples  magnesium hydroxide Suspension 30 milliLiter(s) Oral two times a day PRN Constipation  nalbuphine Injectable 2.5 milliGRAM(s) IV Push every 6 hours PRN Pruritus  naloxone Injectable 0.1 milliGRAM(s) IV Push every 3 minutes PRN For ANY of the following changes in patient status:  A. Breaths Per Minute LESS THAN 10, B. Oxygen saturation LESS THAN 90%, C. Sedation score of 6 for Stop After: 4 Times  ondansetron Injectable 4 milliGRAM(s) IV Push every 6 hours PRN Nausea  oxyCODONE    IR 5 milliGRAM(s) Oral every 3 hours PRN Moderate to Severe Pain (4-10)  oxyCODONE    IR 5 milliGRAM(s) Oral once PRN Moderate to Severe Pain (4-10)  simethicone 80 milliGRAM(s) Chew every 4 hours PRN Gas        Assessment and Plan  POD # 1  s/p  section. Stable.  Encourage ambulation  Harman bolanos, Due to void  Regular diet as tolerated  Follow up CBC in am            
OB Progress Note: LTCS, POD#2    S: 26yo POD#2 s/p LTCS. Pain is well controlled. She is tolerating a regular diet and passing flatus. She is voiding spontaneously, and ambulating without difficulty. Denies CP/SOB. Denies lightheadedness/dizziness. Denies N/V.    O:  Vitals:  Vital Signs Last 24 Hrs  T(C): 36.9 (28 Jul 2022 06:00), Max: 37 (27 Jul 2022 12:16)  T(F): 98.5 (28 Jul 2022 06:00), Max: 98.6 (27 Jul 2022 12:16)  HR: 65 (28 Jul 2022 06:00) (65 - 84)  BP: 130/87 (28 Jul 2022 06:00) (116/76 - 130/87)  BP(mean): --  RR: 18 (28 Jul 2022 06:00) (18 - 18)  SpO2: 98% (28 Jul 2022 06:00) (97% - 100%)    Parameters below as of 28 Jul 2022 06:00  Patient On (Oxygen Delivery Method): room air        MEDICATIONS  (STANDING):  acetaminophen     Tablet .. 975 milliGRAM(s) Oral <User Schedule>  ascorbic acid 500 milliGRAM(s) Oral three times a day  diphtheria/tetanus/pertussis (acellular) Vaccine (ADAcel) 0.5 milliLiter(s) IntraMuscular once  ferrous    sulfate 325 milliGRAM(s) Oral three times a day  heparin   Injectable 5000 Unit(s) SubCutaneous every 12 hours  ibuprofen  Tablet. 600 milliGRAM(s) Oral every 6 hours  lactated ringers Bolus 1000 milliLiter(s) IV Bolus once  lactated ringers. 1000 milliLiter(s) (125 mL/Hr) IV Continuous <Continuous>  oxytocin Infusion 333.333 milliUNIT(s)/Min (1000 mL/Hr) IV Continuous <Continuous>  oxytocin Infusion 333.333 milliUNIT(s)/Min (1000 mL/Hr) IV Continuous <Continuous>  valACYclovir 1000 milliGRAM(s) Oral daily      MEDICATIONS  (PRN):  diphenhydrAMINE 25 milliGRAM(s) Oral every 6 hours PRN Pruritus  lanolin Ointment 1 Application(s) Topical every 6 hours PRN Sore Nipples  magnesium hydroxide Suspension 30 milliLiter(s) Oral two times a day PRN Constipation  oxyCODONE    IR 5 milliGRAM(s) Oral once PRN Moderate to Severe Pain (4-10)  oxyCODONE    IR 5 milliGRAM(s) Oral every 3 hours PRN Moderate to Severe Pain (4-10)  simethicone 80 milliGRAM(s) Chew every 4 hours PRN Gas      Labs:  Blood type: A Positive  Rubella IgG: RPR: Negative                          9.0<L>   6.13 >-----------< 123<L>    ( 07-27 @ 07:10 )             28.4<L>                        11.6   9.22 >-----------< 166    ( 07-25 @ 23:47 )             35.6    07-26-22 @ 00:12      138  |  107  |  13  ----------------------------<  77  4.2   |  18<L>  |  0.79        Ca    9.1      26 Jul 2022 00:12    TPro  6.8  /  Alb  3.4  /  TBili  0.1<L>  /  DBili  x   /  AST  13  /  ALT  6<L>  /  AlkPhos  147<H>  07-26-22 @ 00:12          PE:  General: NAD  Abdomen: Soft, appropriately tender, incision c/d/i.  Extremities: No erythema, no pitting edema

## 2022-07-28 NOTE — DISCHARGE NOTE OB - MEDICATION SUMMARY - MEDICATIONS TO STOP TAKING
I will STOP taking the medications listed below when I get home from the hospital:    Valtrex 1 g oral tablet  -- 1 tab(s) by mouth once a day   -- It is very important that you take or use this exactly as directed.  Do not skip doses or discontinue unless directed by your doctor.

## 2022-07-28 NOTE — DISCHARGE NOTE OB - NS MD DC FALL RISK RISK
For information on Fall & Injury Prevention, visit: [email or web address] OR  [email or web address] OR  https://www.cdc.gov/steadi/patient.html

## 2022-07-28 NOTE — DISCHARGE NOTE OB - PATIENT PORTAL LINK FT
You can access the FollowMyHealth Patient Portal offered by [facility] by registering at the following website: [email or web address]. By joining [facility] FollowMyHealth portal, you will also be able to view your health information using other applications (apps) compatible with our system.

## 2022-07-28 NOTE — PROGRESS NOTE ADULT - ASSESSMENT
A/P: 28yo POD#2 s/p LTCS.  Patient is stable and doing well post-operatively.    - Continue regular diet.  - Increase ambulation.  - Continue motrin, tylenol, oxycodone PRN for pain control.      Tien Cuba PGY1
A/P: 28yo POD#1 s/p LTCS.  Patient is stable and doing well post-operatively.    - Continue regular diet.  - Increase ambulation.  - Continue motrin, tylenol, oxycodone PRN for pain control.    - F/u AM CBC    Tien Cuba PGY1

## 2022-09-25 ENCOUNTER — EMERGENCY (EMERGENCY)
Facility: HOSPITAL | Age: 27
LOS: 1 days | Discharge: LEFT BEFORE DISCHARGE | End: 2022-09-25
Attending: STUDENT IN AN ORGANIZED HEALTH CARE EDUCATION/TRAINING PROGRAM

## 2022-09-25 VITALS
HEIGHT: 64 IN | WEIGHT: 214.95 LBS | RESPIRATION RATE: 17 BRPM | DIASTOLIC BLOOD PRESSURE: 67 MMHG | HEART RATE: 79 BPM | SYSTOLIC BLOOD PRESSURE: 111 MMHG | OXYGEN SATURATION: 98 % | TEMPERATURE: 98 F

## 2022-09-25 PROCEDURE — L9991: CPT

## 2022-12-20 ENCOUNTER — INPATIENT (INPATIENT)
Facility: HOSPITAL | Age: 27
LOS: 1 days | Discharge: HOME | DRG: 446 | End: 2022-12-22
Attending: SURGERY | Admitting: SURGERY
Payer: COMMERCIAL

## 2022-12-20 VITALS
HEART RATE: 60 BPM | SYSTOLIC BLOOD PRESSURE: 92 MMHG | WEIGHT: 199.96 LBS | HEIGHT: 66 IN | DIASTOLIC BLOOD PRESSURE: 54 MMHG | OXYGEN SATURATION: 99 % | RESPIRATION RATE: 16 BRPM

## 2022-12-20 LAB
ALBUMIN SERPL ELPH-MCNC: 4 G/DL — SIGNIFICANT CHANGE UP (ref 3.3–5)
ALP SERPL-CCNC: 120 U/L — SIGNIFICANT CHANGE UP (ref 40–120)
ALT FLD-CCNC: 69 U/L — HIGH (ref 10–45)
ANION GAP SERPL CALC-SCNC: 12 MMOL/L — SIGNIFICANT CHANGE UP (ref 5–17)
APTT BLD: 29.5 SEC — SIGNIFICANT CHANGE UP (ref 27.5–35.5)
AST SERPL-CCNC: 100 U/L — HIGH (ref 10–40)
BASE EXCESS BLDV CALC-SCNC: -1.8 MMOL/L — SIGNIFICANT CHANGE UP (ref -2–3)
BASOPHILS # BLD AUTO: 0.06 K/UL — SIGNIFICANT CHANGE UP (ref 0–0.2)
BASOPHILS NFR BLD AUTO: 0.5 % — SIGNIFICANT CHANGE UP (ref 0–2)
BILIRUB SERPL-MCNC: 0.3 MG/DL — SIGNIFICANT CHANGE UP (ref 0.2–1.2)
BUN SERPL-MCNC: 11 MG/DL — SIGNIFICANT CHANGE UP (ref 7–23)
CA-I SERPL-SCNC: 1.26 MMOL/L — SIGNIFICANT CHANGE UP (ref 1.15–1.33)
CALCIUM SERPL-MCNC: 9.4 MG/DL — SIGNIFICANT CHANGE UP (ref 8.4–10.5)
CHLORIDE BLDV-SCNC: 106 MMOL/L — SIGNIFICANT CHANGE UP (ref 96–108)
CHLORIDE SERPL-SCNC: 104 MMOL/L — SIGNIFICANT CHANGE UP (ref 96–108)
CO2 BLDV-SCNC: 24 MMOL/L — SIGNIFICANT CHANGE UP (ref 22–26)
CO2 SERPL-SCNC: 21 MMOL/L — LOW (ref 22–31)
CREAT SERPL-MCNC: 0.61 MG/DL — SIGNIFICANT CHANGE UP (ref 0.5–1.3)
EGFR: 126 ML/MIN/1.73M2 — SIGNIFICANT CHANGE UP
EOSINOPHIL # BLD AUTO: 0.09 K/UL — SIGNIFICANT CHANGE UP (ref 0–0.5)
EOSINOPHIL NFR BLD AUTO: 0.7 % — SIGNIFICANT CHANGE UP (ref 0–6)
GAS PNL BLDV: 137 MMOL/L — SIGNIFICANT CHANGE UP (ref 136–145)
GAS PNL BLDV: SIGNIFICANT CHANGE UP
GAS PNL BLDV: SIGNIFICANT CHANGE UP
GLUCOSE BLDC GLUCOMTR-MCNC: 98 MG/DL — SIGNIFICANT CHANGE UP (ref 70–99)
GLUCOSE BLDV-MCNC: 93 MG/DL — SIGNIFICANT CHANGE UP (ref 70–99)
GLUCOSE SERPL-MCNC: 93 MG/DL — SIGNIFICANT CHANGE UP (ref 70–99)
HCG SERPL-ACNC: <2 MIU/ML — SIGNIFICANT CHANGE UP
HCO3 BLDV-SCNC: 23 MMOL/L — SIGNIFICANT CHANGE UP (ref 22–29)
HCT VFR BLD CALC: 33.8 % — LOW (ref 34.5–45)
HCT VFR BLDA CALC: 33 % — LOW (ref 34.5–46.5)
HGB BLD CALC-MCNC: 10.9 G/DL — LOW (ref 11.7–16.1)
HGB BLD-MCNC: 10.4 G/DL — LOW (ref 11.5–15.5)
IMM GRANULOCYTES NFR BLD AUTO: 0.5 % — SIGNIFICANT CHANGE UP (ref 0–0.9)
INR BLD: 1.19 RATIO — HIGH (ref 0.88–1.16)
LACTATE BLDV-MCNC: 1.3 MMOL/L — SIGNIFICANT CHANGE UP (ref 0.5–2)
LIDOCAIN IGE QN: 51 U/L — SIGNIFICANT CHANGE UP (ref 7–60)
LYMPHOCYTES # BLD AUTO: 1.32 K/UL — SIGNIFICANT CHANGE UP (ref 1–3.3)
LYMPHOCYTES # BLD AUTO: 10.4 % — LOW (ref 13–44)
MCHC RBC-ENTMCNC: 24.6 PG — LOW (ref 27–34)
MCHC RBC-ENTMCNC: 30.8 GM/DL — LOW (ref 32–36)
MCV RBC AUTO: 80.1 FL — SIGNIFICANT CHANGE UP (ref 80–100)
MONOCYTES # BLD AUTO: 0.62 K/UL — SIGNIFICANT CHANGE UP (ref 0–0.9)
MONOCYTES NFR BLD AUTO: 4.9 % — SIGNIFICANT CHANGE UP (ref 2–14)
NEUTROPHILS # BLD AUTO: 10.52 K/UL — HIGH (ref 1.8–7.4)
NEUTROPHILS NFR BLD AUTO: 83 % — HIGH (ref 43–77)
NRBC # BLD: 0 /100 WBCS — SIGNIFICANT CHANGE UP (ref 0–0)
PCO2 BLDV: 39 MMHG — SIGNIFICANT CHANGE UP (ref 39–42)
PH BLDV: 7.38 — SIGNIFICANT CHANGE UP (ref 7.32–7.43)
PLATELET # BLD AUTO: 300 K/UL — SIGNIFICANT CHANGE UP (ref 150–400)
PO2 BLDV: 67 MMHG — HIGH (ref 25–45)
POTASSIUM BLDV-SCNC: 4.1 MMOL/L — SIGNIFICANT CHANGE UP (ref 3.5–5.1)
POTASSIUM SERPL-MCNC: 4.2 MMOL/L — SIGNIFICANT CHANGE UP (ref 3.5–5.3)
POTASSIUM SERPL-SCNC: 4.2 MMOL/L — SIGNIFICANT CHANGE UP (ref 3.5–5.3)
PROT SERPL-MCNC: 7.8 G/DL — SIGNIFICANT CHANGE UP (ref 6–8.3)
PROTHROM AB SERPL-ACNC: 13.7 SEC — HIGH (ref 10.5–13.4)
RBC # BLD: 4.22 M/UL — SIGNIFICANT CHANGE UP (ref 3.8–5.2)
RBC # FLD: 13.1 % — SIGNIFICANT CHANGE UP (ref 10.3–14.5)
SAO2 % BLDV: 93.2 % — HIGH (ref 67–88)
SARS-COV-2 RNA SPEC QL NAA+PROBE: SIGNIFICANT CHANGE UP
SODIUM SERPL-SCNC: 137 MMOL/L — SIGNIFICANT CHANGE UP (ref 135–145)
WBC # BLD: 12.67 K/UL — HIGH (ref 3.8–10.5)
WBC # FLD AUTO: 12.67 K/UL — HIGH (ref 3.8–10.5)

## 2022-12-20 PROCEDURE — 71046 X-RAY EXAM CHEST 2 VIEWS: CPT | Mod: 26

## 2022-12-20 PROCEDURE — 99220: CPT

## 2022-12-20 PROCEDURE — 76700 US EXAM ABDOM COMPLETE: CPT | Mod: 26

## 2022-12-20 RX ORDER — ACETAMINOPHEN 500 MG
1000 TABLET ORAL ONCE
Refills: 0 | Status: COMPLETED | OUTPATIENT
Start: 2022-12-20 | End: 2022-12-20

## 2022-12-20 RX ORDER — SODIUM CHLORIDE 9 MG/ML
1000 INJECTION INTRAMUSCULAR; INTRAVENOUS; SUBCUTANEOUS
Refills: 0 | Status: DISCONTINUED | OUTPATIENT
Start: 2022-12-20 | End: 2022-12-22

## 2022-12-20 RX ORDER — ONDANSETRON 8 MG/1
4 TABLET, FILM COATED ORAL ONCE
Refills: 0 | Status: COMPLETED | OUTPATIENT
Start: 2022-12-20 | End: 2022-12-20

## 2022-12-20 RX ORDER — KETOROLAC TROMETHAMINE 30 MG/ML
15 SYRINGE (ML) INJECTION ONCE
Refills: 0 | Status: DISCONTINUED | OUTPATIENT
Start: 2022-12-20 | End: 2022-12-20

## 2022-12-20 RX ADMIN — Medication 400 MILLIGRAM(S): at 15:11

## 2022-12-20 RX ADMIN — Medication 15 MILLIGRAM(S): at 21:05

## 2022-12-20 RX ADMIN — ONDANSETRON 4 MILLIGRAM(S): 8 TABLET, FILM COATED ORAL at 15:11

## 2022-12-20 RX ADMIN — Medication 1000 MILLIGRAM(S): at 20:06

## 2022-12-20 NOTE — ED CDU PROVIDER INITIAL DAY NOTE - CLINICAL SUMMARY MEDICAL DECISION MAKING FREE TEXT BOX
27yF presented to the ED with RUQ abd pain.  US shows gallstones, but Gen surg does not feel Sx c/w biliary colic.  Sent to CDU for HIDA to r/o cholecystitis.   Impression: biliary colic  Plan:  HIDA to r/o cholecystitis.

## 2022-12-20 NOTE — ED CDU PROVIDER INITIAL DAY NOTE - DETAILS
HIDA, surgery following, NPO, IVF, pain control, repeat labs, DW ED team, vitals every 4 hours, frequent reevaluations

## 2022-12-20 NOTE — ED CDU PROVIDER DISPOSITION NOTE - CLINICAL COURSE
27-year-old female without any significant past medical history chief complaint of right upper quadrant abdominal pain.  Patient notes that the symptoms started today at 1030AM at work.  Patient was having right upper epigastric radiating midsternal pain.  Patient did not take anything for pain control. +Nausea without vomiting. Patient has had similar symptoms twice in the past, with pain resolving within a few minutes.  	Patient had  4 months ago and is currently on menstrual period. Denies diarrhea, fever, urinary complaints.   ED course: WBC 12.67. RUQ US showed "Multiple subcentimeter gallstones measuring up to 0.7 cm. No sonographic evidence of cholecystitis or hydronephrosis." Surgery consulted, recommending HIDA scan. Plan for pain control, NPO, and HIDA in CDU. 27-year-old female without any significant past medical history chief complaint of right upper quadrant abdominal pain.  Patient notes that the symptoms started today at 1030AM at work.  Patient was having right upper epigastric radiating midsternal pain.  Patient did not take anything for pain control. +Nausea without vomiting. Patient has had similar symptoms twice in the past, with pain resolving within a few minutes.  	Patient had  4 months ago and is currently on menstrual period. Denies diarrhea, fever, urinary complaints.   ED course: WBC 12.67. RUQ US showed "Multiple subcentimeter gallstones measuring up to 0.7 cm. No sonographic evidence of cholecystitis or hydronephrosis." Surgery consulted, recommending HIDA scan. Plan for pain control, NPO, and HIDA in CDU.  in cdu, pt with continued intermittent pain, HIDA cancelled by Surgery, admitted to surgery for ERCP/GI, possible surgery

## 2022-12-20 NOTE — ED ADULT NURSE REASSESSMENT NOTE - NS ED NURSE REASSESS COMMENT FT1
Patient resting, breathing unlabored, VSS, no signs of acute distress, reports pain, MD So made aware, pending pain med orders, CDU PA to see, comfort and safety maintained.

## 2022-12-20 NOTE — ED ADULT NURSE REASSESSMENT NOTE - NS ED NURSE REASSESS COMMENT FT1
PT ate food in ED and did not ask RN or Dr if eating was okay.  Surgery notified.  Sent back from NM when they heard she had eaten.

## 2022-12-20 NOTE — CONSULT NOTE ADULT - ASSESSMENT
27-year-old female no PMH presents with right upper quadrant abdominal pain and nausea. Patient has had similar episodes for the past month that have resolved on their own. This episode is more severe. Ultrasound notes cholelithiasis without evidence of cholecystitis.     - HIDA scan to assess for cholecystitis (delayed since patient ate fruit at 16:00)  - Patient may require laparoscopic cholecystectomy pending HIDA results.  - NPO  - Pain control  - Appreciate excellent ED care    Discussed with on-call attending Dr. Rothman    General Surgery  [id number]

## 2022-12-20 NOTE — ED CDU PROVIDER INITIAL DAY NOTE - NS ED ROS FT
Constitutional: No fever or chills  Eyes: No visual changes, no eye pain   CV: No chest pain or lower extremity edema  Resp: No SOB no cough  GI: + abd pain. +nausea No vomiting. No diarrhea.   : No dysuria, hematuria.   MSK: No musculoskeletal pain  Skin: No rash  Psych: No complaints   Neuro: No headache. No numbness or tingling. No weakness.  Endo: No known diabetes

## 2022-12-20 NOTE — ED CDU PROVIDER DISPOSITION NOTE - ATTENDING APP SHARED VISIT CONTRIBUTION OF CARE
Christopher Roberson MD:   I personally saw the patient and performed a substantive portion of the visit including all aspects of the medical decision making.    Summary: 27-year-old female otherwise healthy with recent  4 months ago, who presents with right upper quadrant pain with associated nausea.    In the ED, patient had elevated WBC of 12.67, ultrasound showed multiple subcentimeter gallstones but no evidence of cholecystitis or hydronephrosis.  Surgery was consulted, recommended HIDA scan.    Patient was placed in the CDU for further monitoring, frequent reassessments, Q4 hour vital signs, serial abdominal examinations, pain control, HIDA scan, IV fluids and to remain n.p.o.    Patient was evaluated by me in the morning, and reports mild abdominal pain which is intermittent.  Patient was reevaluated by surgery who deemed patient requires admission to surgery service.  The patient will need to be admitted to the hospital for continued evaluation and management, as well as to optimize medical management and to provide outpatient needs assessment.  Discussed with the accepting physician regarding the initial presentation, diagnostic studies, treatments given in the ED, and current plan of care.   The patient was accepted by and endorsed to the surgery team

## 2022-12-20 NOTE — CONSULT NOTE ADULT - SUBJECTIVE AND OBJECTIVE BOX
Surgery Consult Note    HPI: 27-year-old female without any significant past medical history chief complaint of right upper quadrant abdominal pain. Patient notes that the symptoms started this morning after eating breakfast.  Patient was having right upper epigastric radiating midsternal pain.  Patient did not take anything for pain control. She also experienced nausea but no vomiting.  Patient has been having the symptoms for the past month but has been ignoring them as they have been going away on their own but this time they did not go away and were more severe. Patient endorses 8/10 abdominal pain. Denies fevers/chills, emesis, lightheadedness, palpitations, or changes in mental status.    PAST MEDICAL & SURGICAL HISTORY:    C section    Heart ablation as a child    Allergies    No Known Allergies    Intolerances      Home Medications:   mg oral tablet: 1 tab(s) orally every 6 hours (28 Jul 2022 12:36)    MEDICATIONS  (STANDING):      SOCIAL HISTORY:  FAMILY HISTORY:      ___________________________________________  OBJECTIVE:  Vital Signs Last 24 Hrs  T(C): --  T(F): --  HR: 60 (20 Dec 2022 12:38) (60 - 60)  BP: 92/54 (20 Dec 2022 12:38) (92/54 - 92/54)  BP(mean): --  RR: 16 (20 Dec 2022 12:38) (16 - 16)  SpO2: 99% (20 Dec 2022 12:38) (99% - 99%)    Parameters below as of 20 Dec 2022 12:38  Patient On (Oxygen Delivery Method): nasal cannula  O2 Flow (L/min): 3  CAPILLARY BLOOD GLUCOSE        I&O's Detail    General: Well developed, well nourished, NAD  Neuro: Alert and oriented, no focal deficits, moves all extremities spontaneously  HEENT: NCAT, anicteric, mucosa moist  Respiratory: Airway patent, respirations unlabored  CVS: Regular rate and rhythm  Abdomen: Soft, nondistended. Tender to palpation in left upper quadrant. Positive Landers's sign  Extremities: No edema, sensation and movement grossly intact  Skin: Warm, dry, appropriate color  ____________________________________________  LABS:  CBC Full  -  ( 20 Dec 2022 13:56 )  WBC Count : 12.67 K/uL  RBC Count : 4.22 M/uL  Hemoglobin : 10.4 g/dL  Hematocrit : 33.8 %  Platelet Count - Automated : 300 K/uL  Mean Cell Volume : 80.1 fl  Mean Cell Hemoglobin : 24.6 pg  Mean Cell Hemoglobin Concentration : 30.8 gm/dL  Auto Neutrophil # : 10.52 K/uL  Auto Lymphocyte # : 1.32 K/uL  Auto Monocyte # : 0.62 K/uL  Auto Eosinophil # : 0.09 K/uL  Auto Basophil # : 0.06 K/uL  Auto Neutrophil % : 83.0 %  Auto Lymphocyte % : 10.4 %  Auto Monocyte % : 4.9 %  Auto Eosinophil % : 0.7 %  Auto Basophil % : 0.5 %    12-20    137  |  104  |  11  ----------------------------<  93  4.2   |  21<L>  |  0.61    Ca    9.4      20 Dec 2022 13:56    TPro  7.8  /  Alb  4.0  /  TBili  0.3  /  DBili  x   /  AST  100<H>  /  ALT  69<H>  /  AlkPhos  120  12-20    LIVER FUNCTIONS - ( 20 Dec 2022 13:56 )  Alb: 4.0 g/dL / Pro: 7.8 g/dL / ALK PHOS: 120 U/L / ALT: 69 U/L / AST: 100 U/L / GGT: x           PT/INR - ( 20 Dec 2022 13:56 )   PT: 13.7 sec;   INR: 1.19 ratio         PTT - ( 20 Dec 2022 13:56 )  PTT:29.5 sec          ____________________________________________  MICRO:  RECENT CULTURES:    ____________________________________________  RADIOLOGY:  < from: US Abdomen Complete (US Abdomen Complete .) (12.20.22 @ 14:14) >  IMPRESSION: Multiple subcentimeter gallstones measuring up to 0.7 cm.  No sonographic evidence of cholecystitis orhydronephrosis.    --- End of Report ---    < end of copied text >

## 2022-12-20 NOTE — ED CDU PROVIDER INITIAL DAY NOTE - OBJECTIVE STATEMENT
27-year-old female without any significant past medical history chief complaint of right upper quadrant abdominal pain.  Patient notes that the symptoms started today at 1030AM at work.  Patient was having right upper epigastric radiating midsternal pain.  Patient did not take anything for pain control. +Nausea without vomiting. Patient has had similar symptoms twice in the past, with pain resolving within a few minutes.  Patient had  4 months ago and is currently on menstrual period. Denies diarrhea, fever, urinary complaints.   ED course: WBC 12.67. RUQ US showed "Multiple subcentimeter gallstones measuring up to 0.7 cm. No sonographic evidence of cholecystitis or hydronephrosis." Surgery consulted, recommending HIDA scan. Plan for pain control, NPO, and HIDA in CDU.

## 2022-12-20 NOTE — ED PROVIDER NOTE - PHYSICAL EXAMINATION
GENERAL: Awake, alert, NAD  HEENT: NC/AT, moist mucous membranes, PERRL, EOMI  LUNGS: CTAB, no wheezes or crackles   CARDIAC: RRR, no m/r/g  ABDOMEN: RUQ tenderness + Landers sign + tenderness LUQ   EXT: No edema, no calf tenderness, 2+ DP pulses bilaterally, no deformities.  NEURO: A&Ox3. Moving all extremities.  SKIN: Warm and dry. No rash.  PSYCH: Normal affect.

## 2022-12-20 NOTE — ED CDU PROVIDER INITIAL DAY NOTE - ATTENDING APP SHARED VISIT CONTRIBUTION OF CARE
MD So:  I have personally performed a face to face diagnostic evaluation on this patient with the PA.  I have reviewed the ACP note and agree with the history, exam, and plan of care, except as noted.  History and Exam by me shows   27yF presented to the ED with RUQ abd pain.  US shows gallstones, but Gen surg does not feel Sx c/w biliary colic.  Sent to CDU for HIDA to r/o cholecystitis.   Impression: biliary colic  Plan:  HIDA to r/o cholecystitis.

## 2022-12-20 NOTE — ED PROVIDER NOTE - CLINICAL SUMMARY MEDICAL DECISION MAKING FREE TEXT BOX
27-year-old female chief complaint upper abdominal pain not related to food no past medical history significant differential diagnosis includes but not is limited to Cassandra pathology pancreatitis UTI will assess with labs and imaging.

## 2022-12-20 NOTE — ED CDU PROVIDER DISPOSITION NOTE - NSFOLLOWUPINSTRUCTIONS_ED_ALL_ED_FT
Hydrate.     Please take Tylenol 650mg and Motrin 600mg every 6 hours as needed for pain. For breakthrough/severe pain you can take Oxycodone 5mg every 6 hours. THIS MEDICATION CAN MAKE YOU DROWSY, PLEASE DO NOT DRIVE ON THIS MEDICATION.     We recommend you follow up with your primary care provider within the next 2-3 days, please bring all of your results with you.     Please return to the Emergency Department with new, worsening, or concerning symptoms, such as:  -Severe abdominal pain  -Shortness of breath or trouble breathing  -Pressure, pain, tightness in chest  -Facial drooping, arm weakness, or speech difficulty     *More detailed information regarding your visit and discharge can be found by reviewing this packet

## 2022-12-20 NOTE — ED PROVIDER NOTE - ATTENDING CONTRIBUTION TO CARE
attending Madelyn: 27yF no PMH p/w RUQ pain today. Reports similar pain episodically x weeks, worse today. Assoc with nausea. Exam as above. Will obtain labs, RUQ US eval gallbladder, symptomatic treatment, reassess

## 2022-12-20 NOTE — ED PROVIDER NOTE - OBJECTIVE STATEMENT
27-year-old female without any significant past medical history chief complaint of right upper quadrant abdominal pain.  Patient notes that the symptoms started approximately 1 hour ago prior to arrival was not doing anything specific during the event.  Patient was having right upper epigastric radiating midsternal pain.  Patient did not take anything for pain control denies any nausea or vomiting associated with it.  Patient has been having the symptoms for the past few months but has been ignoring them as they have been going away on their own but this time they did not go away.  Patient had  4 months ago and is currently on menstrual period

## 2022-12-21 DIAGNOSIS — R10.9 UNSPECIFIED ABDOMINAL PAIN: ICD-10-CM

## 2022-12-21 LAB
ALBUMIN SERPL ELPH-MCNC: 3.7 G/DL — SIGNIFICANT CHANGE UP (ref 3.3–5)
ALBUMIN SERPL ELPH-MCNC: 3.9 G/DL — SIGNIFICANT CHANGE UP (ref 3.3–5)
ALP SERPL-CCNC: 241 U/L — HIGH (ref 40–120)
ALP SERPL-CCNC: 266 U/L — HIGH (ref 40–120)
ALT FLD-CCNC: 1282 U/L — HIGH (ref 10–45)
ALT FLD-CCNC: 1384 U/L — HIGH (ref 10–45)
ANION GAP SERPL CALC-SCNC: 12 MMOL/L — SIGNIFICANT CHANGE UP (ref 5–17)
ANION GAP SERPL CALC-SCNC: 13 MMOL/L — SIGNIFICANT CHANGE UP (ref 5–17)
AST SERPL-CCNC: 1190 U/L — HIGH (ref 10–40)
AST SERPL-CCNC: 1553 U/L — HIGH (ref 10–40)
BASOPHILS # BLD AUTO: 0.03 K/UL — SIGNIFICANT CHANGE UP (ref 0–0.2)
BASOPHILS NFR BLD AUTO: 0.7 % — SIGNIFICANT CHANGE UP (ref 0–2)
BILIRUB SERPL-MCNC: 1 MG/DL — SIGNIFICANT CHANGE UP (ref 0.2–1.2)
BILIRUB SERPL-MCNC: 1.3 MG/DL — HIGH (ref 0.2–1.2)
BLD GP AB SCN SERPL QL: NEGATIVE — SIGNIFICANT CHANGE UP
BUN SERPL-MCNC: 10 MG/DL — SIGNIFICANT CHANGE UP (ref 7–23)
BUN SERPL-MCNC: 9 MG/DL — SIGNIFICANT CHANGE UP (ref 7–23)
CALCIUM SERPL-MCNC: 8.9 MG/DL — SIGNIFICANT CHANGE UP (ref 8.4–10.5)
CALCIUM SERPL-MCNC: 9 MG/DL — SIGNIFICANT CHANGE UP (ref 8.4–10.5)
CHLORIDE SERPL-SCNC: 104 MMOL/L — SIGNIFICANT CHANGE UP (ref 96–108)
CHLORIDE SERPL-SCNC: 105 MMOL/L — SIGNIFICANT CHANGE UP (ref 96–108)
CO2 SERPL-SCNC: 20 MMOL/L — LOW (ref 22–31)
CO2 SERPL-SCNC: 22 MMOL/L — SIGNIFICANT CHANGE UP (ref 22–31)
CREAT SERPL-MCNC: 0.59 MG/DL — SIGNIFICANT CHANGE UP (ref 0.5–1.3)
CREAT SERPL-MCNC: 0.6 MG/DL — SIGNIFICANT CHANGE UP (ref 0.5–1.3)
CULTURE RESULTS: SIGNIFICANT CHANGE UP
EGFR: 127 ML/MIN/1.73M2 — SIGNIFICANT CHANGE UP
EGFR: 127 ML/MIN/1.73M2 — SIGNIFICANT CHANGE UP
EOSINOPHIL # BLD AUTO: 0.07 K/UL — SIGNIFICANT CHANGE UP (ref 0–0.5)
EOSINOPHIL NFR BLD AUTO: 1.6 % — SIGNIFICANT CHANGE UP (ref 0–6)
GLUCOSE SERPL-MCNC: 89 MG/DL — SIGNIFICANT CHANGE UP (ref 70–99)
GLUCOSE SERPL-MCNC: 89 MG/DL — SIGNIFICANT CHANGE UP (ref 70–99)
HAV IGM SER-ACNC: SIGNIFICANT CHANGE UP
HBV CORE IGM SER-ACNC: SIGNIFICANT CHANGE UP
HBV SURFACE AB SER-ACNC: SIGNIFICANT CHANGE UP
HBV SURFACE AG SER-ACNC: SIGNIFICANT CHANGE UP
HBV SURFACE AG SER-ACNC: SIGNIFICANT CHANGE UP
HCT VFR BLD CALC: 34.7 % — SIGNIFICANT CHANGE UP (ref 34.5–45)
HCT VFR BLD CALC: 35.1 % — SIGNIFICANT CHANGE UP (ref 34.5–45)
HCV AB S/CO SERPL IA: 0.1 S/CO — SIGNIFICANT CHANGE UP (ref 0–0.99)
HCV AB SERPL-IMP: SIGNIFICANT CHANGE UP
HGB BLD-MCNC: 10.4 G/DL — LOW (ref 11.5–15.5)
HGB BLD-MCNC: 10.5 G/DL — LOW (ref 11.5–15.5)
IMM GRANULOCYTES NFR BLD AUTO: 1.2 % — HIGH (ref 0–0.9)
LYMPHOCYTES # BLD AUTO: 1.07 K/UL — SIGNIFICANT CHANGE UP (ref 1–3.3)
LYMPHOCYTES # BLD AUTO: 25 % — SIGNIFICANT CHANGE UP (ref 13–44)
MAGNESIUM SERPL-MCNC: 2.1 MG/DL — SIGNIFICANT CHANGE UP (ref 1.6–2.6)
MCHC RBC-ENTMCNC: 24 PG — LOW (ref 27–34)
MCHC RBC-ENTMCNC: 24.1 PG — LOW (ref 27–34)
MCHC RBC-ENTMCNC: 29.9 GM/DL — LOW (ref 32–36)
MCHC RBC-ENTMCNC: 30 GM/DL — LOW (ref 32–36)
MCV RBC AUTO: 80.3 FL — SIGNIFICANT CHANGE UP (ref 80–100)
MCV RBC AUTO: 80.3 FL — SIGNIFICANT CHANGE UP (ref 80–100)
MONOCYTES # BLD AUTO: 0.3 K/UL — SIGNIFICANT CHANGE UP (ref 0–0.9)
MONOCYTES NFR BLD AUTO: 7 % — SIGNIFICANT CHANGE UP (ref 2–14)
NEUTROPHILS # BLD AUTO: 2.76 K/UL — SIGNIFICANT CHANGE UP (ref 1.8–7.4)
NEUTROPHILS NFR BLD AUTO: 64.5 % — SIGNIFICANT CHANGE UP (ref 43–77)
NRBC # BLD: 0 /100 WBCS — SIGNIFICANT CHANGE UP (ref 0–0)
NRBC # BLD: 0 /100 WBCS — SIGNIFICANT CHANGE UP (ref 0–0)
PHOSPHATE SERPL-MCNC: 3.9 MG/DL — SIGNIFICANT CHANGE UP (ref 2.5–4.5)
PLATELET # BLD AUTO: 235 K/UL — SIGNIFICANT CHANGE UP (ref 150–400)
PLATELET # BLD AUTO: 243 K/UL — SIGNIFICANT CHANGE UP (ref 150–400)
POTASSIUM SERPL-MCNC: 4.1 MMOL/L — SIGNIFICANT CHANGE UP (ref 3.5–5.3)
POTASSIUM SERPL-MCNC: 4.2 MMOL/L — SIGNIFICANT CHANGE UP (ref 3.5–5.3)
POTASSIUM SERPL-SCNC: 4.1 MMOL/L — SIGNIFICANT CHANGE UP (ref 3.5–5.3)
POTASSIUM SERPL-SCNC: 4.2 MMOL/L — SIGNIFICANT CHANGE UP (ref 3.5–5.3)
PROT SERPL-MCNC: 7.5 G/DL — SIGNIFICANT CHANGE UP (ref 6–8.3)
PROT SERPL-MCNC: 7.7 G/DL — SIGNIFICANT CHANGE UP (ref 6–8.3)
RBC # BLD: 4.32 M/UL — SIGNIFICANT CHANGE UP (ref 3.8–5.2)
RBC # BLD: 4.37 M/UL — SIGNIFICANT CHANGE UP (ref 3.8–5.2)
RBC # FLD: 13.1 % — SIGNIFICANT CHANGE UP (ref 10.3–14.5)
RBC # FLD: 13.2 % — SIGNIFICANT CHANGE UP (ref 10.3–14.5)
RH IG SCN BLD-IMP: POSITIVE — SIGNIFICANT CHANGE UP
SODIUM SERPL-SCNC: 137 MMOL/L — SIGNIFICANT CHANGE UP (ref 135–145)
SODIUM SERPL-SCNC: 139 MMOL/L — SIGNIFICANT CHANGE UP (ref 135–145)
SPECIMEN SOURCE: SIGNIFICANT CHANGE UP
WBC # BLD: 4.03 K/UL — SIGNIFICANT CHANGE UP (ref 3.8–10.5)
WBC # BLD: 4.28 K/UL — SIGNIFICANT CHANGE UP (ref 3.8–10.5)
WBC # FLD AUTO: 4.03 K/UL — SIGNIFICANT CHANGE UP (ref 3.8–10.5)
WBC # FLD AUTO: 4.28 K/UL — SIGNIFICANT CHANGE UP (ref 3.8–10.5)

## 2022-12-21 PROCEDURE — 99253 IP/OBS CNSLTJ NEW/EST LOW 45: CPT | Mod: GC

## 2022-12-21 PROCEDURE — 99217: CPT

## 2022-12-21 PROCEDURE — 74183 MRI ABD W/O CNTR FLWD CNTR: CPT | Mod: 26

## 2022-12-21 RX ORDER — MORPHINE SULFATE 50 MG/1
2 CAPSULE, EXTENDED RELEASE ORAL ONCE
Refills: 0 | Status: DISCONTINUED | OUTPATIENT
Start: 2022-12-21 | End: 2022-12-21

## 2022-12-21 RX ORDER — ENOXAPARIN SODIUM 100 MG/ML
40 INJECTION SUBCUTANEOUS EVERY 24 HOURS
Refills: 0 | Status: DISCONTINUED | OUTPATIENT
Start: 2022-12-21 | End: 2022-12-22

## 2022-12-21 RX ORDER — ONDANSETRON 8 MG/1
4 TABLET, FILM COATED ORAL ONCE
Refills: 0 | Status: DISCONTINUED | OUTPATIENT
Start: 2022-12-21 | End: 2022-12-22

## 2022-12-21 RX ORDER — INFLUENZA VIRUS VACCINE 15; 15; 15; 15 UG/.5ML; UG/.5ML; UG/.5ML; UG/.5ML
0.5 SUSPENSION INTRAMUSCULAR ONCE
Refills: 0 | Status: COMPLETED | OUTPATIENT
Start: 2022-12-21 | End: 2022-12-22

## 2022-12-21 RX ORDER — BNT162B2 ORIGINAL AND OMICRON BA.4/BA.5 .1125; .1125 MG/2.25ML; MG/2.25ML
0.3 INJECTION, SUSPENSION INTRAMUSCULAR ONCE
Refills: 0 | Status: COMPLETED | OUTPATIENT
Start: 2022-12-21 | End: 2022-12-21

## 2022-12-21 RX ADMIN — SODIUM CHLORIDE 110 MILLILITER(S): 9 INJECTION INTRAMUSCULAR; INTRAVENOUS; SUBCUTANEOUS at 10:31

## 2022-12-21 RX ADMIN — MORPHINE SULFATE 2 MILLIGRAM(S): 50 CAPSULE, EXTENDED RELEASE ORAL at 15:22

## 2022-12-21 RX ADMIN — SODIUM CHLORIDE 110 MILLILITER(S): 9 INJECTION INTRAMUSCULAR; INTRAVENOUS; SUBCUTANEOUS at 05:11

## 2022-12-21 NOTE — ED CDU PROVIDER SUBSEQUENT DAY NOTE - ATTENDING APP SHARED VISIT CONTRIBUTION OF CARE
Christopher Roberson MD:   I personally saw the patient and performed a substantive portion of the visit including all aspects of the medical decision making.    Summary: 27-year-old female otherwise healthy with recent  4 months ago, who presents with right upper quadrant pain with associated nausea.    In the ED, patient had elevated WBC of 12.67, ultrasound showed multiple subcentimeter gallstones but no evidence of cholecystitis or hydronephrosis.  Surgery was consulted, recommended HIDA scan.    Patient was placed in the CDU for further monitoring, frequent reassessments, Q4 hour vital signs, serial abdominal examinations, pain control, HIDA scan, IV fluids and to remain n.p.o.    Patient was evaluated by me in the morning, and reports mild abdominal pain which is intermittent.  Patient was reevaluated by surgery who deemed patient requires admission to surgery service.  The patient will need to be admitted to the hospital for continued evaluation and management, as well as to optimize medical management and to provide outpatient needs assessment.  Discussed with the accepting physician regarding the initial presentation, diagnostic studies, treatments given in the ED, and current plan of care.   The patient was accepted by and endorsed to the surgery team.

## 2022-12-21 NOTE — ED CDU PROVIDER SUBSEQUENT DAY NOTE - PHYSICAL EXAMINATION
· CONSTITUTIONAL: Well appearing, awake, alert, oriented to person, place, time/situation and in no apparent distress.  · ENMT: Airway patent.  · EYES: Clear bilaterally  · CARDIAC: Normal rate, regular rhythm.  Heart sounds S1, S2.  No murmurs, rubs or gallops.  · RESPIRATORY: Breath sounds clear and equal bilaterally.  · GASTROINTESTINAL: Abdomen soft, +mild RUQ tenderness  · MUSCULOSKELETAL: Spine appears normal, range of motion is not limited  · NEUROLOGICAL: Alert and oriented, moving all extremities, clear speech, follows commands  · PSYCHIATRIC: Alert and oriented to person, place, time/situation. normal mood and affect.

## 2022-12-21 NOTE — CONSULT NOTE ADULT - ASSESSMENT
27F without any significant past medical history presenting w/ recurrent RUQ abdominal pain.    Impression:  #RUQ abd pain concerning for symptomatic cholelithiasis with possible passed stone given significant rise in transaminases. No evidence of duct dilation on US or obstruction with normal bilirubin.     Recommendation:  - MR/MRCP to evaluate ducts and need for ERCP  - IVF, pain and nausea control  - trend CMP    Note not finalized until signed by attending.    Ondina Ayala PGY-6  Gastroenterology/Hepatology Fellow  Pager #[phone] ([facility]) or [phone] ([facility])  Available on [employer] Teams.  Please contact on-call GI fellow via answering service ([phone]) after 5pm and before 8am, and on weekends.

## 2022-12-21 NOTE — ED CDU PROVIDER SUBSEQUENT DAY NOTE - HISTORY
No interval changes since initial CDU provider note. Pt without new complaint. NAD VSS. Plan to continue pain control, IV hydration, with HIDA this morning in the setting of RUQ pain. Surgery following. Cecy Cosme

## 2022-12-21 NOTE — ED ADULT NURSE REASSESSMENT NOTE - NS ED NURSE REASSESS COMMENT FT1
07.00 Am Received the Pt from  PHUONG Jenkins . Pt is Observed for Transaminitis for MRCP . Received the Pt A&OX 4 obeys commands Gris N/V/D fever chills cp SOB   Comfort care & safety measures continued  IV site looks clean & dry no signs of infiltration noted pt denies  pain IV site .  Pt is advised to call for help  call bell with in the reach pt verbalized the understanding .  pending CDU  MD sidhu . GCS 15/15 A&OX 4 PERRLA  size 3 Strong upper & lower extremities steady gait   No facial droop  No Hand Leg drop denies numbness tingling  Pt is NPO MRI forms sent Pt is admitted awaiting for the bed Continue to monitor

## 2022-12-21 NOTE — PATIENT PROFILE ADULT - FALL HARM RISK - HARM RISK INTERVENTIONS
Assistance OOB with selected safe patient handling equipment/Communicate Risk of Fall with Harm to all staff/Monitor for mental status changes/Monitor gait and stability/Provide patient with walking aids - walker, cane, crutches/Reinforce activity limits and safety measures with patient and family/Reorient to person, place and time as needed/Review medications for side effects contributing to fall risk/Sit up slowly, dangle for a short time, stand at bedside before walking/Tailored Fall Risk Interventions/Toileting schedule using arm’s reach rule for commode and bathroom/Use of alarms - bed, chair and/or voice tab/Visual Cue: Yellow wristband and red socks/Bed in lowest position, wheels locked, appropriate side rails in place/Call bell, personal items and telephone in reach/Instruct patient to call for assistance before getting out of bed or chair/Non-slip footwear when patient is out of bed/[place]rowder to call system/Physically safe environment - no spills, clutter or unnecessary equipment/Purposeful Proactive Rounding/Room/bathroom lighting operational, light cord in reach

## 2022-12-21 NOTE — PATIENT PROFILE ADULT - NSPROPASSIVESMOKEEXPOSURE_GEN_A_NUR
Cristal Argueta is here today for Follow-up (No concern) and Refill Request    Concerns/symptoms: No concern  Medications: medications verified and updated  Refills needed today? No     Tobacco history: verified  Advanced Directives: No not on file, not interested.  BP greater than 140/90? No    Patient would like communication of their results via:    Cell Phone:   Telephone Information:   Mobile [phone]     Okay to leave a message containing results? Yes  Preferred language:  English.    Health Maintenance Due   Topic Date Due   • Cervical Cancer Screening HPV CO-Testing  05/31/2018      Patient is up to date, no discussion needed.    Medicare HRA:           No

## 2022-12-21 NOTE — ED CDU PROVIDER SUBSEQUENT DAY NOTE - PROGRESS NOTE DETAILS
CDU NOTE BRIAN Wilson: pt resting, pain intermittent, right now no pain. when pain comes on it is severe and radiates to her R shoulder and associated with nausea. right now no nausea. no pain. NAD. VSS. abdomen- soft, ttp at RUQ.  plan for HIDA CDU NOTE BRIAN Wilson: spoke with Surgery- cancelling HIDA, plan for GI consult and MRCP, request patient be admitted to their service Dr. Rothman. called HIDA/nuc med and informed them, the test wasn't started yet, will send back to cdu.   Dr. Roberson, ok with plan/admission

## 2022-12-21 NOTE — CONSULT NOTE ADULT - ATTENDING COMMENTS
Pt is a 27 year old female with no significant medical history who presents to [facility] with recurrent right upper quadrant abdominal pain. Ultrasound notes cholelithiasis without evidence of cholecystitis.     A/p  Biliary colic  R/o acute cholecystitis  HiDA scan delayed because the pt ate)  Continue pain control
As above    Pt seen/examined on 12/21/22 in the evening    Impression:    #1.  Intermittent RUQ abd pain  #2.  Abnormal LFTs (massive transaminitis), improving  #3.  Cholelithiasis with distended gallbladder on ultrasound/MRI/MRCP  #4.  No biliary dilation or choledocholithiasis on my review of MRCP images    Recommendations:    #1.  Follow CBC/LFTs  #2.  Await official MRCP report  #3.  NPO after MN for possible ERCP, but low likelihood of needing ERCP.
Yes

## 2022-12-21 NOTE — PATIENT PROFILE ADULT - COVID-19 VACCINE ORDER (FOR [facility] USE ONLY)
Pt called again and stated she would like a call back from the nurse.   coronavirus bivalent (EUA) Booster Vaccine (PFIZER)

## 2022-12-21 NOTE — CONSULT NOTE ADULT - SUBJECTIVE AND OBJECTIVE BOX
Chief Complaint:  Patient is a 27y old  Female who presents with a chief complaint of     HPI: 27F without any significant past medical history presenting w/ RUQ abdominal pain, intermittently for a month however more severe starting yesterday after breakfast. Reports radiating to midsternal area. She also experienced nausea but no vomiting. Denies fevers/chills, emesis, lightheadedness, palpitations, or changes in mental status.    RUQ US notable for cholelithiasis with normal bile ducts. Labs notable at first for mild leukocytosis and elevated transaminases however today with significant increase to AST 1553, ALT 1282, alk phos 241 and bili 1.3.       Allergies:  No Known Allergies      Home Medications:    Hospital Medications:  enoxaparin Injectable 40 milliGRAM(s) SubCutaneous every 24 hours  ondansetron Injectable 4 milliGRAM(s) IV Push once PRN  sodium chloride 0.9%. 1000 milliLiter(s) IV Continuous <Continuous>      PMHX/PSHX:  No pertinent past medical history    No significant past surgical history        Family history:  No pertinent family history in first degree relatives        Social History:     ROS:     General:  No weight loss, fevers, chills, night sweats, fatigue  Eyes:  No vision changes, no yellowing of eyes   ENT:  No throat pain, runny nose  CV:  No chest pain, palpitations  Resp:  No SOB, cough, wheezing  GI:  See HPI  :  No burning with urination, no hematuria   Muscle:  No muscle pain, weakness  Neuro:  No numbness/tingling, memory problems  Psych:  No fatigue, insomnia, mood problems  Heme:  No easy bruisability  Skin:  No rash, itching       PHYSICAL EXAM:     GENERAL:  Appears stated age, well-groomed, well-nourished, no distress  HEENT:  NC/AT,  conjunctivae clear and pink,  no JVD  CHEST:  Full & symmetric excursion, no increased effort, breath sounds clear  HEART:  Regular rhythm, S1, S2, no murmur/rub/S3/S4, no abdominal bruit, no edema  ABDOMEN:  Soft, non-tender, non-distended, normoactive bowel sounds,  no masses ,  EXTREMITIES:  no cyanosis,clubbing or edema  SKIN:  No rash/erythema/ecchymoses/petechiae/wounds/abscess/warm/dry  NEURO:  Alert, oriented    Vital Signs:  Vital Signs Last 24 Hrs  T(C): 36.7 (21 Dec 2022 07:30), Max: 36.7 (21 Dec 2022 07:30)  T(F): 98 (21 Dec 2022 07:30), Max: 98 (21 Dec 2022 07:30)  HR: 58 (21 Dec 2022 07:30) (58 - 89)  BP: 117/75 (21 Dec 2022 07:30) (92/54 - 119/78)  BP(mean): --  RR: 18 (21 Dec 2022 07:30) (16 - 18)  SpO2: 99% (21 Dec 2022 07:30) (98% - 100%)    Parameters below as of 21 Dec 2022 05:18  Patient On (Oxygen Delivery Method): room air      Daily Height in cm: 167.64 (20 Dec 2022 12:38)    Daily     LABS:                        10.5   4.03  )-----------( 235      ( 21 Dec 2022 10:31 )             35.1     12-21    137  |  104  |  10  ----------------------------<  89  4.1   |  20<L>  |  0.59    Ca    9.0      21 Dec 2022 06:21    TPro  7.5  /  Alb  3.7  /  TBili  1.3<H>  /  DBili  x   /  AST  1553<H>  /  ALT  1282<H>  /  AlkPhos  241<H>  12-21    LIVER FUNCTIONS - ( 21 Dec 2022 06:21 )  Alb: 3.7 g/dL / Pro: 7.5 g/dL / ALK PHOS: 241 U/L / ALT: 1282 U/L / AST: 1553 U/L / GGT: x           PT/INR - ( 20 Dec 2022 13:56 )   PT: 13.7 sec;   INR: 1.19 ratio         PTT - ( 20 Dec 2022 13:56 )  PTT:29.5 sec    Amylase Serum--      Lipase serum51       Ammonia--      Imaging:    < from: US Abdomen Complete (US Abdomen Complete .) (12.20.22 @ 14:14) >    FINDINGS:  Liver: Within normal limits.  Bile ducts: Normal caliber. Common bile duct measures 4 mm.  Gallbladder: Multiple subcentimeter gallstones along the gallbladder neck   and body, the largest of which measures 0.7 cm. Negative sonographic   Landers's sign. No pericholecystic fluid or wall thickening.  Pancreas: Visualized portions are within normal limits.  Spleen: 13.4 cm, borderline enlarged.  Right kidney: 12.5 cm. No hydronephrosis.  Left kidney: 12.4 cm. No hydronephrosis.  Ascites: None.  Aorta and IVC: Visualized portions are within normal limits.  Small left pleural effusion.    IMPRESSION: Multiple subcentimeter gallstones measuring up to 0.7 cm.  No sonographic evidence of cholecystitis orhydronephrosis.    --- End of Report ---    < end of copied text >           Chief Complaint:  Patient is a 27y old  Female who presents with a chief complaint of     HPI: 27F without any significant past medical history presenting w/ RUQ abdominal pain, intermittently for a month however more severe starting yesterday after breakfast. She states she had 2 other occurrences since Thanksgiving that lasted for only 5-10 minutes and self-resolved. One time was not associated with eating. Reports radiating to midsternal area and right shoulder. She also experienced nausea but no vomiting. Denies fevers/chills, emesis, hematochezia, melena, diarrhea. Currently without abd pain this AM.    RUQ US notable for cholelithiasis with normal bile ducts. Labs notable at first for mild leukocytosis and elevated transaminases however today with significant increase to AST 1553, ALT 1282, alk phos 241 and bili 1.3.       Allergies:  No Known Allergies      Home Medications:    Hospital Medications:  enoxaparin Injectable 40 milliGRAM(s) SubCutaneous every 24 hours  ondansetron Injectable 4 milliGRAM(s) IV Push once PRN  sodium chloride 0.9%. 1000 milliLiter(s) IV Continuous <Continuous>      PMHX/PSHX:  No pertinent past medical history    No significant past surgical history        Family history:  No pertinent family history in first degree relatives        Social History:     ROS:     General:  No weight loss, fevers, chills, night sweats, fatigue  Eyes:  No vision changes, no yellowing of eyes   ENT:  No throat pain, runny nose  CV:  No chest pain, palpitations  Resp:  No SOB, cough, wheezing  GI:  See HPI  :  No burning with urination, no hematuria   Muscle:  No muscle pain, weakness  Neuro:  No numbness/tingling, memory problems  Psych:  No fatigue, insomnia, mood problems  Heme:  No easy bruisability  Skin:  No rash, itching       PHYSICAL EXAM:     GENERAL:  Appears stated age, well-groomed, well-nourished, no distress  HEENT:  NC/AT,  conjunctivae clear and pink,  no JVD  CHEST:  Full & symmetric excursion, no increased effort, breath sounds clear  HEART:  Regular rhythm, S1, S2, no murmur/rub/S3/S4, no abdominal bruit, no edema  ABDOMEN:  Soft, non-tender, non-distended, normoactive bowel sounds,  no masses ,  EXTREMITIES:  no cyanosis,clubbing or edema  SKIN:  No rash/erythema/ecchymoses/petechiae/wounds/abscess/warm/dry  NEURO:  Alert, oriented    Vital Signs:  Vital Signs Last 24 Hrs  T(C): 36.7 (21 Dec 2022 07:30), Max: 36.7 (21 Dec 2022 07:30)  T(F): 98 (21 Dec 2022 07:30), Max: 98 (21 Dec 2022 07:30)  HR: 58 (21 Dec 2022 07:30) (58 - 89)  BP: 117/75 (21 Dec 2022 07:30) (92/54 - 119/78)  BP(mean): --  RR: 18 (21 Dec 2022 07:30) (16 - 18)  SpO2: 99% (21 Dec 2022 07:30) (98% - 100%)    Parameters below as of 21 Dec 2022 05:18  Patient On (Oxygen Delivery Method): room air      Daily Height in cm: 167.64 (20 Dec 2022 12:38)    Daily     LABS:                        10.5   4.03  )-----------( 235      ( 21 Dec 2022 10:31 )             35.1     12-21    137  |  104  |  10  ----------------------------<  89  4.1   |  20<L>  |  0.59    Ca    9.0      21 Dec 2022 06:21    TPro  7.5  /  Alb  3.7  /  TBili  1.3<H>  /  DBili  x   /  AST  1553<H>  /  ALT  1282<H>  /  AlkPhos  241<H>  12-21    LIVER FUNCTIONS - ( 21 Dec 2022 06:21 )  Alb: 3.7 g/dL / Pro: 7.5 g/dL / ALK PHOS: 241 U/L / ALT: 1282 U/L / AST: 1553 U/L / GGT: x           PT/INR - ( 20 Dec 2022 13:56 )   PT: 13.7 sec;   INR: 1.19 ratio         PTT - ( 20 Dec 2022 13:56 )  PTT:29.5 sec    Amylase Serum--      Lipase serum51       Ammonia--      Imaging:    < from: US Abdomen Complete (US Abdomen Complete .) (12.20.22 @ 14:14) >    FINDINGS:  Liver: Within normal limits.  Bile ducts: Normal caliber. Common bile duct measures 4 mm.  Gallbladder: Multiple subcentimeter gallstones along the gallbladder neck   and body, the largest of which measures 0.7 cm. Negative sonographic   Landers's sign. No pericholecystic fluid or wall thickening.  Pancreas: Visualized portions are within normal limits.  Spleen: 13.4 cm, borderline enlarged.  Right kidney: 12.5 cm. No hydronephrosis.  Left kidney: 12.4 cm. No hydronephrosis.  Ascites: None.  Aorta and IVC: Visualized portions are within normal limits.  Small left pleural effusion.    IMPRESSION: Multiple subcentimeter gallstones measuring up to 0.7 cm.  No sonographic evidence of cholecystitis orhydronephrosis.    --- End of Report ---    < end of copied text >

## 2022-12-22 ENCOUNTER — TRANSCRIPTION ENCOUNTER (OUTPATIENT)
Age: 27
End: 2022-12-22

## 2022-12-22 VITALS
TEMPERATURE: 98 F | OXYGEN SATURATION: 100 % | DIASTOLIC BLOOD PRESSURE: 70 MMHG | RESPIRATION RATE: 17 BRPM | HEART RATE: 91 BPM | SYSTOLIC BLOOD PRESSURE: 109 MMHG

## 2022-12-22 LAB
ALBUMIN SERPL ELPH-MCNC: 3.6 G/DL — SIGNIFICANT CHANGE UP (ref 3.3–5)
ALP SERPL-CCNC: 240 U/L — HIGH (ref 40–120)
ALT FLD-CCNC: 876 U/L — HIGH (ref 10–45)
ANION GAP SERPL CALC-SCNC: 11 MMOL/L — SIGNIFICANT CHANGE UP (ref 5–17)
AST SERPL-CCNC: 369 U/L — HIGH (ref 10–40)
BILIRUB SERPL-MCNC: 1.2 MG/DL — SIGNIFICANT CHANGE UP (ref 0.2–1.2)
BUN SERPL-MCNC: 9 MG/DL — SIGNIFICANT CHANGE UP (ref 7–23)
CALCIUM SERPL-MCNC: 8.7 MG/DL — SIGNIFICANT CHANGE UP (ref 8.4–10.5)
CHLORIDE SERPL-SCNC: 107 MMOL/L — SIGNIFICANT CHANGE UP (ref 96–108)
CO2 SERPL-SCNC: 21 MMOL/L — LOW (ref 22–31)
CREAT SERPL-MCNC: 0.59 MG/DL — SIGNIFICANT CHANGE UP (ref 0.5–1.3)
EGFR: 127 ML/MIN/1.73M2 — SIGNIFICANT CHANGE UP
GLUCOSE SERPL-MCNC: 76 MG/DL — SIGNIFICANT CHANGE UP (ref 70–99)
HCT VFR BLD CALC: 32.6 % — LOW (ref 34.5–45)
HGB BLD-MCNC: 9.5 G/DL — LOW (ref 11.5–15.5)
MAGNESIUM SERPL-MCNC: 2.2 MG/DL — SIGNIFICANT CHANGE UP (ref 1.6–2.6)
MCHC RBC-ENTMCNC: 23.9 PG — LOW (ref 27–34)
MCHC RBC-ENTMCNC: 29.1 GM/DL — LOW (ref 32–36)
MCV RBC AUTO: 82.1 FL — SIGNIFICANT CHANGE UP (ref 80–100)
NRBC # BLD: 0 /100 WBCS — SIGNIFICANT CHANGE UP (ref 0–0)
PHOSPHATE SERPL-MCNC: 4 MG/DL — SIGNIFICANT CHANGE UP (ref 2.5–4.5)
PLATELET # BLD AUTO: 217 K/UL — SIGNIFICANT CHANGE UP (ref 150–400)
POTASSIUM SERPL-MCNC: 3.9 MMOL/L — SIGNIFICANT CHANGE UP (ref 3.5–5.3)
POTASSIUM SERPL-SCNC: 3.9 MMOL/L — SIGNIFICANT CHANGE UP (ref 3.5–5.3)
PROT SERPL-MCNC: 6.7 G/DL — SIGNIFICANT CHANGE UP (ref 6–8.3)
RBC # BLD: 3.97 M/UL — SIGNIFICANT CHANGE UP (ref 3.8–5.2)
RBC # FLD: 13.2 % — SIGNIFICANT CHANGE UP (ref 10.3–14.5)
SODIUM SERPL-SCNC: 139 MMOL/L — SIGNIFICANT CHANGE UP (ref 135–145)
WBC # BLD: 4.33 K/UL — SIGNIFICANT CHANGE UP (ref 3.8–10.5)
WBC # FLD AUTO: 4.33 K/UL — SIGNIFICANT CHANGE UP (ref 3.8–10.5)

## 2022-12-22 PROCEDURE — 90686 IIV4 VACC NO PRSV 0.5 ML IM: CPT

## 2022-12-22 PROCEDURE — 83735 ASSAY OF MAGNESIUM: CPT

## 2022-12-22 PROCEDURE — 82435 ASSAY OF BLOOD CHLORIDE: CPT

## 2022-12-22 PROCEDURE — 85014 HEMATOCRIT: CPT

## 2022-12-22 PROCEDURE — 83605 ASSAY OF LACTIC ACID: CPT

## 2022-12-22 PROCEDURE — 99285 EMERGENCY DEPT VISIT HI MDM: CPT

## 2022-12-22 PROCEDURE — 86706 HEP B SURFACE ANTIBODY: CPT

## 2022-12-22 PROCEDURE — 86901 BLOOD TYPING SEROLOGIC RH(D): CPT

## 2022-12-22 PROCEDURE — G0378: CPT

## 2022-12-22 PROCEDURE — 87086 URINE CULTURE/COLONY COUNT: CPT

## 2022-12-22 PROCEDURE — 84132 ASSAY OF SERUM POTASSIUM: CPT

## 2022-12-22 PROCEDURE — 80074 ACUTE HEPATITIS PANEL: CPT

## 2022-12-22 PROCEDURE — 96375 TX/PRO/DX INJ NEW DRUG ADDON: CPT

## 2022-12-22 PROCEDURE — 82962 GLUCOSE BLOOD TEST: CPT

## 2022-12-22 PROCEDURE — 85610 PROTHROMBIN TIME: CPT

## 2022-12-22 PROCEDURE — 84100 ASSAY OF PHOSPHORUS: CPT

## 2022-12-22 PROCEDURE — 76700 US EXAM ABDOM COMPLETE: CPT

## 2022-12-22 PROCEDURE — 87635 SARS-COV-2 COVID-19 AMP PRB: CPT

## 2022-12-22 PROCEDURE — 71046 X-RAY EXAM CHEST 2 VIEWS: CPT

## 2022-12-22 PROCEDURE — 85018 HEMOGLOBIN: CPT

## 2022-12-22 PROCEDURE — 83690 ASSAY OF LIPASE: CPT

## 2022-12-22 PROCEDURE — 86850 RBC ANTIBODY SCREEN: CPT

## 2022-12-22 PROCEDURE — 82947 ASSAY GLUCOSE BLOOD QUANT: CPT

## 2022-12-22 PROCEDURE — 85027 COMPLETE CBC AUTOMATED: CPT

## 2022-12-22 PROCEDURE — 87340 HEPATITIS B SURFACE AG IA: CPT

## 2022-12-22 PROCEDURE — 93005 ELECTROCARDIOGRAM TRACING: CPT

## 2022-12-22 PROCEDURE — 84295 ASSAY OF SERUM SODIUM: CPT

## 2022-12-22 PROCEDURE — 86900 BLOOD TYPING SEROLOGIC ABO: CPT

## 2022-12-22 PROCEDURE — 96374 THER/PROPH/DIAG INJ IV PUSH: CPT

## 2022-12-22 PROCEDURE — 80053 COMPREHEN METABOLIC PANEL: CPT

## 2022-12-22 PROCEDURE — 84702 CHORIONIC GONADOTROPIN TEST: CPT

## 2022-12-22 PROCEDURE — 85025 COMPLETE CBC W/AUTO DIFF WBC: CPT

## 2022-12-22 PROCEDURE — 85730 THROMBOPLASTIN TIME PARTIAL: CPT

## 2022-12-22 PROCEDURE — 82803 BLOOD GASES ANY COMBINATION: CPT

## 2022-12-22 PROCEDURE — 99231 SBSQ HOSP IP/OBS SF/LOW 25: CPT

## 2022-12-22 PROCEDURE — 82330 ASSAY OF CALCIUM: CPT

## 2022-12-22 PROCEDURE — 74183 MRI ABD W/O CNTR FLWD CNTR: CPT | Mod: MA

## 2022-12-22 RX ORDER — SODIUM CHLORIDE 9 MG/ML
500 INJECTION, SOLUTION INTRAVENOUS
Refills: 0 | Status: DISCONTINUED | OUTPATIENT
Start: 2022-12-22 | End: 2022-12-22

## 2022-12-22 RX ADMIN — INFLUENZA VIRUS VACCINE 0.5 MILLILITER(S): 15; 15; 15; 15 SUSPENSION INTRAMUSCULAR at 20:51

## 2022-12-22 RX ADMIN — ENOXAPARIN SODIUM 40 MILLIGRAM(S): 100 INJECTION SUBCUTANEOUS at 06:04

## 2022-12-22 NOTE — PROGRESS NOTE ADULT - ASSESSMENT
IOP within reasonable range TODAY.
27-year-old female no PMH presents with right upper quadrant abdominal pain and nausea. Patient has had similar episodes for the past month that have resolved on their own. This episode is more severe. Ultrasound notes cholelithiasis without evidence of cholecystitis.     - f/u MRCP  - possible GI consult pending MRCP results   - Patient may require laparoscopic cholecystectomy  - NPO  - Pain control as needed   - VTE ppx with [facility]     Trauma Surgery   [id number]   
27F without any significant past medical history presenting w/ recurrent RUQ abdominal pain.    Impression:  #RUQ abd pain concerning for symptomatic cholelithiasis with possible passed stone given significant rise in transaminases. No evidence of duct dilation on US or obstruction with normal bilirubin. MRCP neg for choledocho.    Recommendation:  - no indication for EUS or ERCP at this time  - CCY per surgery  - no further inpatient GI w/u needed, please call back with questions    Note not finalized until signed by attending.    Ondina Ayala PGY-6  Gastroenterology/Hepatology Fellow  Pager #[phone] (MIKE) or [phone] (NS)  Available on [employer] Teams.  Please contact on-call GI fellow via answering service ([phone]) after 5pm and before 8am, and on weekends.

## 2022-12-22 NOTE — PROGRESS NOTE ADULT - SUBJECTIVE AND OBJECTIVE BOX
SURGERY DAILY PROGRESS NOTE:       SUBJECTIVE/ROS: Patient seen and evaluated on AM rounds. Patient reports that she no longer has any pain. Pain tends to come in waves and after eating. This pain started around Thanksgiving. Denies nausea, vomiting, chest pain, shortness of breath.        OBJECTIVE:    Vital Signs Last 24 Hrs  T(C): 36.8 (22 Dec 2022 08:54), Max: 36.9 (21 Dec 2022 15:12)  T(F): 98.3 (22 Dec 2022 08:54), Max: 98.4 (21 Dec 2022 15:12)  HR: 65 (22 Dec 2022 08:54) (54 - 78)  BP: 113/74 (22 Dec 2022 08:54) (96/61 - 113/74)  BP(mean): --  RR: 18 (22 Dec 2022 08:54) (16 - 18)  SpO2: 98% (22 Dec 2022 08:54) (96% - 99%)    Parameters below as of 22 Dec 2022 08:54  Patient On (Oxygen Delivery Method): room air      I&O's Detail    21 Dec 2022 07:01  -  22 Dec 2022 07:00  --------------------------------------------------------  IN:    sodium chloride 0.9%: 1210 mL  Total IN: 1210 mL    OUT:    Oral Fluid: 0 mL  Total OUT: 0 mL    Total NET: 1210 mL        Daily     Daily   MEDICATIONS  (STANDING):  coronavirus bivalent (EUA) Booster Vaccine (PFIZER) 0.3 milliLiter(s) IntraMuscular once  enoxaparin Injectable 40 milliGRAM(s) SubCutaneous every 24 hours  influenza   Vaccine 0.5 milliLiter(s) IntraMuscular once  sodium chloride 0.9%. 1000 milliLiter(s) (110 mL/Hr) IV Continuous <Continuous>    MEDICATIONS  (PRN):  ondansetron Injectable 4 milliGRAM(s) IV Push once PRN Nausea and/or Vomiting      LABS:                        9.5    4.33  )-----------( 217      ( 22 Dec 2022 07:24 )             32.6     12-22    139  |  107  |  9   ----------------------------<  76  3.9   |  21<L>  |  0.59    Ca    8.7      22 Dec 2022 07:24  Phos  4.0     12-22  Mg     2.2     12-22    TPro  6.7  /  Alb  3.6  /  TBili  1.2  /  DBili  x   /  AST  369<H>  /  ALT  876<H>  /  AlkPhos  240<H>  12-22    PT/INR - ( 20 Dec 2022 13:56 )   PT: 13.7 sec;   INR: 1.19 ratio      PTT - ( 20 Dec 2022 13:56 )  PTT:29.5 sec      PE:   General: Well developed, well nourished, NAD  Respiratory: Airway patent, respirations unlabored  Abdomen: Soft, nondistended, nontender, positive Landers's sign  Extremities: No edema, sensation and movement grossly intact

## 2022-12-22 NOTE — PRE PROCEDURE NOTE - PRE PROCEDURE EVALUATION
Attending Physician:     Dr. Singh                       Procedure: ERCP    Indication for Procedure: Choledocholithiasis / Biliary obstruction   ________________________________________________________  PAST MEDICAL & SURGICAL HISTORY:  No pertinent past medical history      No significant past surgical history        ALLERGIES:  No Known Allergies    HOME MEDICATIONS:   mg oral tablet: 1 tab(s) orally every 6 hours    AICD/PPM: [ ] yes   [ ] no    PERTINENT LAB DATA:                        9.5    4.33  )-----------( 217      ( 22 Dec 2022 07:24 )             32.6     12-22    139  |  107  |  9   ----------------------------<  76  3.9   |  21<L>  |  0.59    Ca    8.7      22 Dec 2022 07:24  Phos  4.0     12-22  Mg     2.2     12-22    TPro  6.7  /  Alb  3.6  /  TBili  1.2  /  DBili  x   /  AST  369<H>  /  ALT  876<H>  /  AlkPhos  240<H>  12-22                PHYSICAL EXAMINATION:    T(C): 36.6  HR: 69  BP: 109/63  RR: 18  SpO2: 97%    Constitutional: NAD    Respiratory: CTAB/L  Cardiovascular: RRR  Gastrointestinal: BS+, soft, NT/ND  Extremities: No peripheral edema  Neurological: A/O x 3, no confusion       COMMENTS:  The patient is a suitable candidate for the planned procedure unless box checked [ ]  No, explain:

## 2022-12-22 NOTE — DISCHARGE NOTE PROVIDER - HOSPITAL COURSE
27-year-old female no PMH presents with right upper quadrant abdominal pain and nausea. Patient has had similar episodes for the past month that have resolved on their own. This episode is more severe. Ultrasound notes cholelithiasis without evidence of cholecystitis. MRCP obtained demonstrated cholelithiasis, no evidence of choledocholithiasis, no biliary duct dilation. Mild hepatomegaly and borderline splenomegaly.          27-year-old female no PMH presents with right upper quadrant abdominal pain and nausea. Patient has had similar episodes for the past month that have resolved on their own. This episode is more severe. Ultrasound notes cholelithiasis without evidence of cholecystitis. MRCP obtained demonstrated cholelithiasis, no evidence of choledocholithiasis, no biliary duct dilation. Mild hepatomegaly and borderline splenomegaly. Patient's liver enzymes were downtrending, she tolerated PO diet, was having bowel function, and pain was under control at the time of discharge.

## 2022-12-22 NOTE — DISCHARGE NOTE NURSING/CASE MANAGEMENT/SOCIAL WORK - PATIENT PORTAL LINK FT
You can access the FollowMyHealth Patient Portal offered by [facility] by registering at the following website: [email or web address]. By joining [facility]’s FollowMyHealth portal, you will also be able to view your health information using other applications (apps) compatible with our system.

## 2022-12-22 NOTE — DISCHARGE NOTE PROVIDER - NSDCCPCAREPLAN_GEN_ALL_CORE_FT
PRINCIPAL DISCHARGE DIAGNOSIS  Diagnosis: Abdominal pain  Assessment and Plan of Treatment: You have gallstones that were seen on MRI.   Follow up outpatient with the surgeon for elective removal of gallbladder.      SECONDARY DISCHARGE DIAGNOSES  Diagnosis: Transaminitis  Assessment and Plan of Treatment:

## 2022-12-22 NOTE — PROGRESS NOTE ADULT - SUBJECTIVE AND OBJECTIVE BOX
Chief Complaint:  Patient is a 27y old  Female who presents with a chief complaint of     Interval Events: no acute events overnight  - no abd pain overnight  - transaminases improving      Hospital Medications:  coronavirus bivalent (EUA) Booster Vaccine (PFIZER) 0.3 milliLiter(s) IntraMuscular once  enoxaparin Injectable 40 milliGRAM(s) SubCutaneous every 24 hours  influenza   Vaccine 0.5 milliLiter(s) IntraMuscular once  ondansetron Injectable 4 milliGRAM(s) IV Push once PRN  sodium chloride 0.9%. 1000 milliLiter(s) IV Continuous <Continuous>      PMHX/PSHX:  No pertinent past medical history    No significant past surgical history            ROS:     General:  No weight loss, fevers, chills, night sweats, fatigue   Eyes:  No vision changes  ENT:  No sore throat, pain, runny nose  CV:  No chest pain, palpitations, dizziness   Resp:  No SOB, cough, wheezing  GI:  See HPI  :  No burning with urination, hematuria  Muscle:  No pain, weakness  Neuro:  No weakness/tingling, memory problems  Psych:  No fatigue, insomnia, mood problems, depression  Heme:  No easy bruisability  Skin:  No rash, edema      PHYSICAL EXAM:     GENERAL:  Well developed, no distress  HEENT:  NC/AT,  conjunctivae clear, sclera anicteric  CHEST:  Full & symmetric excursion, no increased effort w/ respirations  HEART:  Regular rhythm & rate  ABDOMEN:  Soft, non-tender, non-distended  EXTREMITIES:  no LE  edema  SKIN:  No rash/erythema/ecchymoses/petechiae/wounds/jaundice  NEURO:  Alert, oriented    Vital Signs:  Vital Signs Last 24 Hrs  T(C): 36.8 (22 Dec 2022 08:54), Max: 36.9 (21 Dec 2022 15:12)  T(F): 98.3 (22 Dec 2022 08:54), Max: 98.4 (21 Dec 2022 15:12)  HR: 65 (22 Dec 2022 08:54) (54 - 78)  BP: 113/74 (22 Dec 2022 08:54) (96/61 - 113/74)  BP(mean): --  RR: 18 (22 Dec 2022 08:54) (16 - 18)  SpO2: 98% (22 Dec 2022 08:54) (96% - 99%)    Parameters below as of 22 Dec 2022 08:54  Patient On (Oxygen Delivery Method): room air      Daily     Daily     LABS:                        9.5    4.33  )-----------( 217      ( 22 Dec 2022 07:24 )             32.6     12-22    139  |  107  |  9   ----------------------------<  76  3.9   |  21<L>  |  0.59    Ca    8.7      22 Dec 2022 07:24  Phos  4.0     12-22  Mg     2.2     12-22    TPro  6.7  /  Alb  3.6  /  TBili  1.2  /  DBili  x   /  AST  369<H>  /  ALT  876<H>  /  AlkPhos  240<H>  12-22    LIVER FUNCTIONS - ( 22 Dec 2022 07:24 )  Alb: 3.6 g/dL / Pro: 6.7 g/dL / ALK PHOS: 240 U/L / ALT: 876 U/L / AST: 369 U/L / GGT: x           PT/INR - ( 20 Dec 2022 13:56 )   PT: 13.7 sec;   INR: 1.19 ratio         PTT - ( 20 Dec 2022 13:56 )  PTT:29.5 sec        Imaging:  < from: MR MRCP w/wo IV Cont (12.21.22 @ 21:27) >  FINDINGS:  LOWER CHEST: Within normal limits.    LIVER: Mildly enlarged, measures 18.2 cm craniocaudally in the right   lobe. No steatosis. No focal liver lesion.  BILE DUCTS: Normal caliber. Common bile duct measures 4 mm in diameter.   No filling defects to suggest choledocholithiasis. No abnormal   enhancement.  GALLBLADDER: Cholelithiasis. No wall thickening or pericholecystic edema.  SPLEEN: Borderline enlarged, measures 14.0 cm.  PANCREAS: Normal signal and enhancement. No pancreatic duct dilation.  ADRENALS: Within normal limits.  KIDNEYS/URETERS: Left upper pole renal cyst measuring 9 mm. Normal and   symmetric renal parenchymal enhancement. No hydronephrosis.    VISUALIZED PORTIONS:  BOWEL: Within normal limits.  PERITONEUM: No ascites.  VESSELS: Within normal limits.  RETROPERITONEUM/LYMPH NODES: No lymphadenopathy.  ABDOMINAL WALL: Within normal limits.  BONES: Within normal limits.    IMPRESSION:  Cholelithiasis. No evidence of choledocholithiasis. No biliary duct   dilation.    Mild hepatomegaly.    Borderline splenomegaly.    < end of copied text >

## 2022-12-22 NOTE — DISCHARGE NOTE NURSING/CASE MANAGEMENT/SOCIAL WORK - NSDCVIVACCINE_GEN_ALL_CORE_FT
influenza, injectable, quadrivalent, preservative free; 22-Dec-2022 20:51; Delicia Cohen (RN); [employer]; [id number] (Exp. Date: 30-Jun-2023); IntraMuscular; Deltoid Left.; 0.5 milliLiter(s); VIS (VIS Published: 06-Aug-2021, VIS Presented: 22-Dec-2022);

## 2022-12-22 NOTE — DISCHARGE NOTE PROVIDER - CARE PROVIDER_API CALL
Kim Hall (MD)  Surgery; Surgical Critical Care  [address] [address]  Phone: [phone]  Fax: [phone]  Follow Up Time: Routine

## 2023-03-03 NOTE — PRE-ANESTHESIA EVALUATION ADULT - BSA (M2)
Sinusitis (Antibiotic Treatment)    The sinuses are air-filled spaces within the bones of the face. They connect to the inside of the nose. Sinusitis is an inflammation of the tissue that lines the sinuses. Sinusitis can occur during a cold. It can also happen due to allergies to pollens and other particles in the air. Sinusitis can cause symptoms of sinus congestion and a feeling of fullness. A sinus infection causes fever, headache, and facial pain. There is often green or yellow fluid draining from the nose or into the back of the throat (post-nasal drip). You have been given antibiotics to treat this condition.   Home care    Take the full course of antibiotics as instructed. Don't stop taking them, even when you feel better.    Drink plenty of water, hot tea, and other liquids as directed by the healthcare provider. This may help thin nasal mucus. It also may help your sinuses drain fluids.    Heat may help soothe painful areas of your face. Use a towel soaked in hot water. Or,  the shower and direct the warm spray onto your face. Using a vaporizer along with a menthol rub at night may also help soothe symptoms.     An expectorant with guaifenesin may help thin nasal mucus and help your sinuses drain fluids. Talk with your provider or pharmacists before taking an over-the-counter (OTC) medicine if you have any questions about it or its side effects..    You can use an OTC decongestant, unless a similar medicine was prescribed to you. Nasal sprays work the fastest. Use one that contains phenylephrine or oxymetazoline. First blow your nose gently. Then use the spray. Don't use these medicines more often than directed on the label. If you do, your symptoms may get worse. You may also take pills that contain pseudoephedrine. Don t use products that combine multiple medicines. This is because side effects may be increased. Read labels. You can also ask the pharmacist for help. (People with high blood  pressure should not use decongestants. They can raise blood pressure.) Talk with your provider or pharmacist if you have any questions about the medicine..    OTC antihistamines may help if allergies contributed to your sinusitis. Talk with your provider or pharmacist if you have any questions about the medicine..    Don't use nasal rinses or irrigation during an acute sinus infection, unless your healthcare provider tells you to. Rinsing may spread the infection to other areas in your sinuses.    Use acetaminophen or ibuprofen to control pain, unless another pain medicine was prescribed to you. If you have chronic liver or kidney disease or ever had a stomach ulcer, talk with your healthcare provider before using these medicines. Never give aspirin to anyone under age 18 who is ill with a fever. It may cause severe liver damage.    Don't smoke. This can make symptoms worse.    Follow-up care  Follow up with your healthcare provider, or as advised.   When to seek medical advice  Call your healthcare provider if any of these occur:     Facial pain or headache that gets worse    Stiff neck    Unusual drowsiness or confusion    Swelling of your forehead or eyelids    Symptoms don't go away in 10 days    Vision problems, such as blurred or double vision    Fever of 100.4 F (38 C) or higher, or as directed by your healthcare provider  Call 911  Call 911 if any of these occur:     Seizure    Trouble breathing    Feeling dizzy or faint    Fingernails, skin or lips look blue, purple , or gray  Prevention  Here are steps you can take to help prevent an infection:     Keep good hand washing habits.    Don t have close contact with people who have sore throats, colds, or other upper respiratory infections.    Don t smoke, and stay away from secondhand smoke.    Stay up to date with of your vaccines.  [employer] last reviewed this educational content on 12/1/2019 2000-2021 [employer]. All rights reserved. This  information is not intended as a substitute for professional medical care. Always follow your healthcare professional's instructions.        Dear Kim Duarte    After reviewing your responses, I've been able to diagnose you with Acute sinusitis with symptoms > 10 days.      Based on your responses and diagnosis, I have prescribed   Orders Placed This Encounter     amoxicillin-clavulanate (AUGMENTIN) 875-125 MG tablet    to treat your symptoms. I have sent this to your pharmacy.?     It is also important to stay well hydrated, get lots of rest and take over-the-counter decongestants,?tylenol?or ibuprofen if you?are able to?take those medications per your primary care provider to help relieve discomfort.?     It is important that you take?all of?your prescribed medication even if your symptoms are improving after a few doses.? Taking?all of?your medicine helps prevent the symptoms from returning.?     If your symptoms worsen, you develop severe headache, vomiting, high fever (>102), or are not improving in 7 days, please contact your primary care provider for an appointment or visit any of our convenient Walk-in Care or Urgent Care Centers to be seen which can be found on our website?here.?     Thanks again for choosing?us?as your health care partner,?   ?  Donya Caro MD?    2.08

## 2023-10-07 NOTE — OB RN DELIVERY SUMMARY - NS_GBSRECENTABX_OBGYN_ALL_OB_DT
IMPORTANT EVENTS THIS SHIFT:  Patient daughter at bedside, interpreting for patient.   Patient ambulated well to toilet with minimal/stand by assist.   No longer requiring opti-flow, now on  12L HFNC.   Cannot tolerate PO medications, failed swallow and video swallow eval. Plan to retest when respiratory symptoms improve.   IMPORTANT EVENTS COMING UP/GOALS (PLEASE INCLUDE WHITE BOARD AND DISCHARGE BOARD UPDATES):   PATIENT SPECIAL NEEDS/ACCOMMODATIONS:                25-Jul-2022 23:37

## 2023-12-26 NOTE — DISCHARGE NOTE OB - NSCORESITESY/N_GEN_A_CORE_RD
What do you do next:   Continue your home medications unless we have specifically changed them  If medications were prescribed today, take these as directed.  You can use over-the-counter acetaminophen (Tylenol ) and ibuprofen for fever or pain control as applicable to your visit today.  Follow up as indicated below    When do you return: If you have recurrent fainting or near fainting, uncontrollable pain, uncontrolled fevers, severe shortness of breath, or any other symptoms that concern you, please return to the ED for reevaluation.    Thank you for allowing us to care for you today.     No